# Patient Record
Sex: MALE | Race: WHITE | NOT HISPANIC OR LATINO
[De-identification: names, ages, dates, MRNs, and addresses within clinical notes are randomized per-mention and may not be internally consistent; named-entity substitution may affect disease eponyms.]

---

## 2017-02-16 PROBLEM — Z00.00 ENCOUNTER FOR PREVENTIVE HEALTH EXAMINATION: Status: ACTIVE | Noted: 2017-02-16

## 2017-03-06 ENCOUNTER — APPOINTMENT (OUTPATIENT)
Age: 72
End: 2017-03-06

## 2017-03-06 ENCOUNTER — OUTPATIENT (OUTPATIENT)
Dept: OUTPATIENT SERVICES | Facility: HOSPITAL | Age: 72
LOS: 1 days | Discharge: HOME | End: 2017-03-06

## 2017-03-16 ENCOUNTER — APPOINTMENT (OUTPATIENT)
Dept: SURGERY | Facility: CLINIC | Age: 72
End: 2017-03-16

## 2017-06-27 DIAGNOSIS — K43.2 INCISIONAL HERNIA WITHOUT OBSTRUCTION OR GANGRENE: ICD-10-CM

## 2017-06-27 DIAGNOSIS — I10 ESSENTIAL (PRIMARY) HYPERTENSION: ICD-10-CM

## 2017-06-27 DIAGNOSIS — K66.0 PERITONEAL ADHESIONS (POSTPROCEDURAL) (POSTINFECTION): ICD-10-CM

## 2017-06-27 DIAGNOSIS — E78.00 PURE HYPERCHOLESTEROLEMIA, UNSPECIFIED: ICD-10-CM

## 2020-04-04 ENCOUNTER — INPATIENT (INPATIENT)
Facility: HOSPITAL | Age: 75
LOS: 5 days | Discharge: HOME | End: 2020-04-10
Attending: INTERNAL MEDICINE | Admitting: INTERNAL MEDICINE
Payer: MEDICARE

## 2020-04-04 VITALS
RESPIRATION RATE: 24 BRPM | OXYGEN SATURATION: 92 % | HEART RATE: 118 BPM | SYSTOLIC BLOOD PRESSURE: 138 MMHG | DIASTOLIC BLOOD PRESSURE: 76 MMHG | TEMPERATURE: 99 F

## 2020-04-04 LAB
ALBUMIN SERPL ELPH-MCNC: 3.4 G/DL — LOW (ref 3.5–5.2)
ALBUMIN SERPL ELPH-MCNC: 3.6 G/DL — SIGNIFICANT CHANGE UP (ref 3.5–5.2)
ALP SERPL-CCNC: 58 U/L — SIGNIFICANT CHANGE UP (ref 30–115)
ALP SERPL-CCNC: 80 U/L — SIGNIFICANT CHANGE UP (ref 30–115)
ALT FLD-CCNC: 14 U/L — SIGNIFICANT CHANGE UP (ref 0–41)
ALT FLD-CCNC: 31 U/L — SIGNIFICANT CHANGE UP (ref 0–41)
ANION GAP SERPL CALC-SCNC: 12 MMOL/L — SIGNIFICANT CHANGE UP (ref 7–14)
ANION GAP SERPL CALC-SCNC: 16 MMOL/L — HIGH (ref 7–14)
AST SERPL-CCNC: 31 U/L — SIGNIFICANT CHANGE UP (ref 0–41)
AST SERPL-CCNC: 37 U/L — SIGNIFICANT CHANGE UP (ref 0–41)
BASE EXCESS BLDV CALC-SCNC: 1.3 MMOL/L — SIGNIFICANT CHANGE UP (ref -2–2)
BASOPHILS # BLD AUTO: 0.01 K/UL — SIGNIFICANT CHANGE UP (ref 0–0.2)
BASOPHILS NFR BLD AUTO: 0.2 % — SIGNIFICANT CHANGE UP (ref 0–1)
BILIRUB SERPL-MCNC: 0.5 MG/DL — SIGNIFICANT CHANGE UP (ref 0.2–1.2)
BILIRUB SERPL-MCNC: 0.8 MG/DL — SIGNIFICANT CHANGE UP (ref 0.2–1.2)
BUN SERPL-MCNC: 23 MG/DL — HIGH (ref 10–20)
BUN SERPL-MCNC: 9 MG/DL — LOW (ref 10–20)
CA-I SERPL-SCNC: 1.07 MMOL/L — LOW (ref 1.12–1.3)
CALCIUM SERPL-MCNC: 8.3 MG/DL — LOW (ref 8.5–10.1)
CALCIUM SERPL-MCNC: 8.7 MG/DL — SIGNIFICANT CHANGE UP (ref 8.5–10.1)
CHLORIDE SERPL-SCNC: 72 MMOL/L — LOW (ref 98–110)
CHLORIDE SERPL-SCNC: 98 MMOL/L — SIGNIFICANT CHANGE UP (ref 98–110)
CO2 SERPL-SCNC: 22 MMOL/L — SIGNIFICANT CHANGE UP (ref 17–32)
CO2 SERPL-SCNC: 36 MMOL/L — HIGH (ref 17–32)
CREAT SERPL-MCNC: 0.5 MG/DL — LOW (ref 0.7–1.5)
CREAT SERPL-MCNC: 1 MG/DL — SIGNIFICANT CHANGE UP (ref 0.7–1.5)
EOSINOPHIL # BLD AUTO: 0.01 K/UL — SIGNIFICANT CHANGE UP (ref 0–0.7)
EOSINOPHIL NFR BLD AUTO: 0.2 % — SIGNIFICANT CHANGE UP (ref 0–8)
GAS PNL BLDV: 136 MMOL/L — SIGNIFICANT CHANGE UP (ref 136–145)
GAS PNL BLDV: SIGNIFICANT CHANGE UP
GAS PNL BLDV: SIGNIFICANT CHANGE UP
GLUCOSE SERPL-MCNC: 112 MG/DL — HIGH (ref 70–99)
GLUCOSE SERPL-MCNC: 186 MG/DL — HIGH (ref 70–99)
HCO3 BLDV-SCNC: 24 MMOL/L — SIGNIFICANT CHANGE UP (ref 22–29)
HCT VFR BLD CALC: 36.5 % — LOW (ref 42–52)
HCT VFR BLDA CALC: 49 % — HIGH (ref 34–44)
HGB BLD CALC-MCNC: 16 G/DL — SIGNIFICANT CHANGE UP (ref 14–18)
HGB BLD-MCNC: 11.8 G/DL — LOW (ref 14–18)
IMM GRANULOCYTES NFR BLD AUTO: 0.8 % — HIGH (ref 0.1–0.3)
LACTATE BLDV-MCNC: 2 MMOL/L — HIGH (ref 0.5–1.6)
LACTATE SERPL-SCNC: 2 MMOL/L — SIGNIFICANT CHANGE UP (ref 0.7–2)
LIDOCAIN IGE QN: 24 U/L — SIGNIFICANT CHANGE UP (ref 7–60)
LYMPHOCYTES # BLD AUTO: 0.44 K/UL — LOW (ref 1.2–3.4)
LYMPHOCYTES # BLD AUTO: 7.3 % — LOW (ref 20.5–51.1)
MAGNESIUM SERPL-MCNC: 1.7 MG/DL — LOW (ref 1.8–2.4)
MCHC RBC-ENTMCNC: 23.8 PG — LOW (ref 27–31)
MCHC RBC-ENTMCNC: 32.3 G/DL — SIGNIFICANT CHANGE UP (ref 32–37)
MCV RBC AUTO: 73.6 FL — LOW (ref 80–94)
MONOCYTES # BLD AUTO: 0.72 K/UL — HIGH (ref 0.1–0.6)
MONOCYTES NFR BLD AUTO: 11.9 % — HIGH (ref 1.7–9.3)
NEUTROPHILS # BLD AUTO: 4.8 K/UL — SIGNIFICANT CHANGE UP (ref 1.4–6.5)
NEUTROPHILS NFR BLD AUTO: 79.6 % — HIGH (ref 42.2–75.2)
NRBC # BLD: 0 /100 WBCS — SIGNIFICANT CHANGE UP (ref 0–0)
PCO2 BLDV: 33 MMHG — LOW (ref 41–51)
PH BLDV: 7.47 — HIGH (ref 7.26–7.43)
PLATELET # BLD AUTO: 227 K/UL — SIGNIFICANT CHANGE UP (ref 130–400)
PO2 BLDV: 30 MMHG — SIGNIFICANT CHANGE UP (ref 20–40)
POTASSIUM BLDV-SCNC: 3 MMOL/L — LOW (ref 3.3–5.6)
POTASSIUM SERPL-MCNC: 3.6 MMOL/L — SIGNIFICANT CHANGE UP (ref 3.5–5)
POTASSIUM SERPL-MCNC: 3.8 MMOL/L — SIGNIFICANT CHANGE UP (ref 3.5–5)
POTASSIUM SERPL-SCNC: 3.6 MMOL/L — SIGNIFICANT CHANGE UP (ref 3.5–5)
POTASSIUM SERPL-SCNC: 3.8 MMOL/L — SIGNIFICANT CHANGE UP (ref 3.5–5)
PROT SERPL-MCNC: 6 G/DL — SIGNIFICANT CHANGE UP (ref 6–8)
PROT SERPL-MCNC: 6.3 G/DL — SIGNIFICANT CHANGE UP (ref 6–8)
RBC # BLD: 4.96 M/UL — SIGNIFICANT CHANGE UP (ref 4.7–6.1)
RBC # FLD: 15.6 % — HIGH (ref 11.5–14.5)
SAO2 % BLDV: 60 % — SIGNIFICANT CHANGE UP
SODIUM SERPL-SCNC: 120 MMOL/L — LOW (ref 135–146)
SODIUM SERPL-SCNC: 136 MMOL/L — SIGNIFICANT CHANGE UP (ref 135–146)
WBC # BLD: 6.03 K/UL — SIGNIFICANT CHANGE UP (ref 4.8–10.8)
WBC # FLD AUTO: 6.03 K/UL — SIGNIFICANT CHANGE UP (ref 4.8–10.8)

## 2020-04-04 PROCEDURE — 99285 EMERGENCY DEPT VISIT HI MDM: CPT

## 2020-04-04 PROCEDURE — 99223 1ST HOSP IP/OBS HIGH 75: CPT | Mod: AI

## 2020-04-04 PROCEDURE — 93010 ELECTROCARDIOGRAM REPORT: CPT

## 2020-04-04 PROCEDURE — 71045 X-RAY EXAM CHEST 1 VIEW: CPT | Mod: 26

## 2020-04-04 RX ORDER — SODIUM CHLORIDE 9 MG/ML
1000 INJECTION, SOLUTION INTRAVENOUS
Refills: 0 | Status: DISCONTINUED | OUTPATIENT
Start: 2020-04-04 | End: 2020-04-06

## 2020-04-04 RX ORDER — SODIUM CHLORIDE 9 MG/ML
1000 INJECTION INTRAMUSCULAR; INTRAVENOUS; SUBCUTANEOUS
Refills: 0 | Status: DISCONTINUED | OUTPATIENT
Start: 2020-04-04 | End: 2020-04-04

## 2020-04-04 RX ORDER — LANOLIN ALCOHOL/MO/W.PET/CERES
5 CREAM (GRAM) TOPICAL AT BEDTIME
Refills: 0 | Status: DISCONTINUED | OUTPATIENT
Start: 2020-04-04 | End: 2020-04-10

## 2020-04-04 RX ORDER — ACETAMINOPHEN 500 MG
650 TABLET ORAL ONCE
Refills: 0 | Status: COMPLETED | OUTPATIENT
Start: 2020-04-04 | End: 2020-04-04

## 2020-04-04 RX ORDER — AMLODIPINE BESYLATE 2.5 MG/1
1 TABLET ORAL
Qty: 0 | Refills: 0 | DISCHARGE

## 2020-04-04 RX ORDER — HEPARIN SODIUM 5000 [USP'U]/ML
5000 INJECTION INTRAVENOUS; SUBCUTANEOUS EVERY 8 HOURS
Refills: 0 | Status: DISCONTINUED | OUTPATIENT
Start: 2020-04-04 | End: 2020-04-10

## 2020-04-04 RX ADMIN — SODIUM CHLORIDE 125 MILLILITER(S): 9 INJECTION INTRAMUSCULAR; INTRAVENOUS; SUBCUTANEOUS at 14:46

## 2020-04-04 RX ADMIN — HEPARIN SODIUM 5000 UNIT(S): 5000 INJECTION INTRAVENOUS; SUBCUTANEOUS at 22:11

## 2020-04-04 RX ADMIN — Medication 650 MILLIGRAM(S): at 12:43

## 2020-04-04 NOTE — ED PROVIDER NOTE - PHYSICAL EXAMINATION
VITAL SIGNS: I have reviewed nursing notes and confirm.  CONSTITUTIONAL: Well-developed; well-nourished; in no acute distress.  SKIN: Skin exam is warm and dry, no acute rash.  HEAD: Normocephalic; atraumatic.  EYES: PERRL, EOM intact; conjunctiva and sclera clear.  ENT: No nasal discharge; airway clear. TMs clear.  NECK: Supple; non tender.  CARD: S1, S2 normal; no murmurs, gallops, or rubs. Regular rate and rhythm.  RESP: bibasilar rales  ABD: Normal bowel sounds; soft; non-distended; non-tender;  EXT: Normal ROM. No clubbing, cyanosis or edema.  NEURO: aox3, cn2-12 grossly normal, 5/5 strength all ext, sensation intact,   PSYCH: Cooperative, appropriate.

## 2020-04-04 NOTE — H&P ADULT - HISTORY OF PRESENT ILLNESS
73 yo male with PMH of Asthma and HTN, presents with c/o weakness, sob and cough.  Patient states he has been having cough and sob x 2 weeks that has been getting progressively worse. He also states that he has been having fever that are getting worse. He endorses that his wife has tested positive for COVID 19. He denies any other symptoms.

## 2020-04-04 NOTE — ED PROVIDER NOTE - OBJECTIVE STATEMENT
75 yo m with pmh of asthma, htn, presents with c/o weakness, sob and cough.  pt says has been having cough and sob x 2 weeks.  wife also had similar sx, and tested positive for covid.  pt says in the last 2-3 days, he has become so weak, so came in for eval.  pt has also been having fever.  no cp, no abd pain, but has had diarrhea.  no n/v.

## 2020-04-04 NOTE — H&P ADULT - NSHPPHYSICALEXAM_GEN_ALL_CORE
PHYSICAL EXAM:  GENERAL: patient in mild distress however he is comfortable. Speaking in full sentences.   HEAD:  Atraumatic, Normocephalic  EYES: EOMI, PERRLA, conjunctiva and sclera clear  NECK: Supple, No JVD  CHEST/LUNG: Good air entry bilaterally. Crackles noted at bilateral bases.   HEART: Regular rate and rhythm; No murmurs;   ABDOMEN: Soft, Nontender, Nondistended; Bowel sounds present; No guarding  EXTREMITIES:  2+ Peripheral Pulses, No cyanosis or edema  PSYCH: AAOx3  NEUROLOGY: non-focal  SKIN: No rashes or lesions

## 2020-04-04 NOTE — ED ADULT NURSE NOTE - NSIMPLEMENTINTERV_GEN_ALL_ED
Implemented All Fall with Harm Risk Interventions:  [place] to call system. Call bell, personal items and telephone within reach. Instruct patient to call for assistance. Room bathroom lighting operational. Non-slip footwear when patient is off stretcher. Physically safe environment: no spills, clutter or unnecessary equipment. Stretcher in lowest position, wheels locked, appropriate side rails in place. Provide visual cue, wrist band, yellow gown, etc. Monitor gait and stability. Monitor for mental status changes and reorient to person, place, and time. Review medications for side effects contributing to fall risk. Reinforce activity limits and safety measures with patient and family. Provide visual clues: red socks.

## 2020-04-04 NOTE — ED PROVIDER NOTE - NS ED ROS FT
Review of Systems:  	•	CONSTITUTIONAL - no fever, no diaphoresis, no weight change, generalized weakness  	•	SKIN - no rash  	•	HEMATOLOGIC - no bleeding, no bruising  	•	EYES - no eye pain, no blurred vision  	•	ENT - no change in hearing, no pain  	•	RESPIRATORY - + shortness of breath, + cough  	•	CARDIAC - no chest pain, no palpitations  	•	GI - no abd pain, no nausea, no vomiting, no diarrhea, no constipation, no bleeding  	•	 - no dysuria, no hematuria, no flank pain, no urinary retention  	•	MUSCULOSKELETAL - no joint paint, no swelling, no redness  	•	NEUROLOGIC - no weakness, no headache, no paresthesias, no dizziness  All other systems negative, unless specified in HPI

## 2020-04-04 NOTE — H&P ADULT - ASSESSMENT
73 yo male with PMH of Asthma and HTN, presents with c/o weakness, sob and cough.  Patient states he has been having cough and sob x 2 weeks that has been getting progressively worse. He also states that he has been having fever that are getting worse. He endorses that his wife has tested positive for COVID 19. He denies any other symptoms.     1) Hypoxia likely secondary to Viral Etiology   Chest Xray reveals evidence of acute cardiopulmonary disease.   Follow with Procalcitonin and COVID 19 testing   Start Hydroxychloroquine once EKG resulted   Tylenol for fever     2) Hyponatremia   Continue with Normal Saline   Follow with 8pm cmp     3) DVT Prophylaxis   Heparin Sub Q     4) HTN   stable at this time   Will hold blood pressure medications at this time     5) Disposition   Admit to medicine   Follow with COVID 19 testing   Start Hydroxychloroquine once EKG resulted

## 2020-04-04 NOTE — ED PROVIDER NOTE - CLINICAL SUMMARY MEDICAL DECISION MAKING FREE TEXT BOX
Pt with hypoxia and dyspnic, desaturates to 90% on RA, wife was covid +, cxr reassuring, will r/o covid

## 2020-04-04 NOTE — H&P ADULT - NSHPLABSRESULTS_GEN_ALL_CORE
11.8   6.03  )-----------( 227      ( 04 Apr 2020 12:33 )             36.5     04-04    120<L>  |  72<L>  |  9<L>  ----------------------------<  186<H>  3.8   |  36<H>  |  0.5<L>    Ca    8.7      04 Apr 2020 12:33  Mg     1.7     04-04    TPro  6.0  /  Alb  3.6  /  TBili  0.8  /  DBili  x   /  AST  37  /  ALT  31  /  AlkPhos  80  04-04    < from: Xray Chest 1 View-PORTABLE IMMEDIATE (04.04.20 @ 14:45) >    Impression:      No radiographic evidence of acute cardiopulmonary disease.    < end of copied text >

## 2020-04-04 NOTE — H&P ADULT - ATTENDING COMMENTS
patient seen and examined , agree with pgy 3 assesment and plan except as indicated above,   GEN Lying in no acute distress  HEENT Pupils equal and reactive to light and accommodationSupple Neck  PULM Clear to auscultation bilaterally  CV s1s2 regular rate and rhythm  GI + bowel sounds nontnender  EXT no cyanosis or edema  PSYCH awake alert and oriented x 3  INTEG No Lesions  NEURO MENDOZA  #Acute hypoxic respiraotry failure secondary to viral etiology suspect covid 19   titrate off of oxygen , dyspneic on speaking (3-4 words at a time) , check crp , esr, ddimer  #Hyponatremia - likely error because on vbg and subsequent cmp 136 , monitor for now   #Anemia no evidence of blood loss will monitor   Progress Note Handoff    Pending:  titration off of oxygen , monitor crp ,esr , and if titrated off of oxygen expect dc 24-48 hours  Family discussion: patient verbalized understanding and agreeable to plan of care     Disposition: Home___

## 2020-04-05 LAB
ALBUMIN SERPL ELPH-MCNC: 3.1 G/DL — LOW (ref 3.5–5.2)
ALP SERPL-CCNC: 56 U/L — SIGNIFICANT CHANGE UP (ref 30–115)
ALT FLD-CCNC: 13 U/L — SIGNIFICANT CHANGE UP (ref 0–41)
ANION GAP SERPL CALC-SCNC: 13 MMOL/L — SIGNIFICANT CHANGE UP (ref 7–14)
AST SERPL-CCNC: 32 U/L — SIGNIFICANT CHANGE UP (ref 0–41)
BASOPHILS # BLD AUTO: 0 K/UL — SIGNIFICANT CHANGE UP (ref 0–0.2)
BASOPHILS # BLD AUTO: 0.03 K/UL — SIGNIFICANT CHANGE UP (ref 0–0.2)
BASOPHILS NFR BLD AUTO: 0 % — SIGNIFICANT CHANGE UP (ref 0–1)
BASOPHILS NFR BLD AUTO: 0.2 % — SIGNIFICANT CHANGE UP (ref 0–1)
BILIRUB SERPL-MCNC: 0.6 MG/DL — SIGNIFICANT CHANGE UP (ref 0.2–1.2)
BUN SERPL-MCNC: 23 MG/DL — HIGH (ref 10–20)
CALCIUM SERPL-MCNC: 8.4 MG/DL — LOW (ref 8.5–10.1)
CHLORIDE SERPL-SCNC: 102 MMOL/L — SIGNIFICANT CHANGE UP (ref 98–110)
CO2 SERPL-SCNC: 24 MMOL/L — SIGNIFICANT CHANGE UP (ref 17–32)
CREAT SERPL-MCNC: 1.1 MG/DL — SIGNIFICANT CHANGE UP (ref 0.7–1.5)
D DIMER BLD IA.RAPID-MCNC: 752 NG/ML DDU — HIGH (ref 0–230)
EOSINOPHIL # BLD AUTO: 0 K/UL — SIGNIFICANT CHANGE UP (ref 0–0.7)
EOSINOPHIL # BLD AUTO: 0 K/UL — SIGNIFICANT CHANGE UP (ref 0–0.7)
EOSINOPHIL NFR BLD AUTO: 0 % — SIGNIFICANT CHANGE UP (ref 0–8)
EOSINOPHIL NFR BLD AUTO: 0 % — SIGNIFICANT CHANGE UP (ref 0–8)
FIBRINOGEN PPP-MCNC: >700 MG/DL — HIGH (ref 204.4–570.6)
GIANT PLATELETS BLD QL SMEAR: PRESENT — SIGNIFICANT CHANGE UP
GLUCOSE SERPL-MCNC: 134 MG/DL — HIGH (ref 70–99)
HCT VFR BLD CALC: 40.6 % — LOW (ref 42–52)
HCT VFR BLD CALC: 42.8 % — SIGNIFICANT CHANGE UP (ref 42–52)
HGB BLD-MCNC: 14.4 G/DL — SIGNIFICANT CHANGE UP (ref 14–18)
HGB BLD-MCNC: 15.7 G/DL — SIGNIFICANT CHANGE UP (ref 14–18)
IMM GRANULOCYTES NFR BLD AUTO: 0.9 % — HIGH (ref 0.1–0.3)
LYMPHOCYTES # BLD AUTO: 0.3 K/UL — LOW (ref 1.2–3.4)
LYMPHOCYTES # BLD AUTO: 1.14 K/UL — LOW (ref 1.2–3.4)
LYMPHOCYTES # BLD AUTO: 2.6 % — LOW (ref 20.5–51.1)
LYMPHOCYTES # BLD AUTO: 7.2 % — LOW (ref 20.5–51.1)
MACROCYTES BLD QL: SLIGHT — SIGNIFICANT CHANGE UP
MANUAL SMEAR VERIFICATION: SIGNIFICANT CHANGE UP
MCHC RBC-ENTMCNC: 34.4 PG — HIGH (ref 27–31)
MCHC RBC-ENTMCNC: 35.5 G/DL — SIGNIFICANT CHANGE UP (ref 32–37)
MCHC RBC-ENTMCNC: 35.6 PG — HIGH (ref 27–31)
MCHC RBC-ENTMCNC: 36.7 G/DL — SIGNIFICANT CHANGE UP (ref 32–37)
MCV RBC AUTO: 96.9 FL — HIGH (ref 80–94)
MCV RBC AUTO: 97.1 FL — HIGH (ref 80–94)
MONOCYTES # BLD AUTO: 0.3 K/UL — SIGNIFICANT CHANGE UP (ref 0.1–0.6)
MONOCYTES # BLD AUTO: 0.36 K/UL — SIGNIFICANT CHANGE UP (ref 0.1–0.6)
MONOCYTES NFR BLD AUTO: 2.3 % — SIGNIFICANT CHANGE UP (ref 1.7–9.3)
MONOCYTES NFR BLD AUTO: 2.6 % — SIGNIFICANT CHANGE UP (ref 1.7–9.3)
NEUTROPHILS # BLD AUTO: 10.78 K/UL — HIGH (ref 1.4–6.5)
NEUTROPHILS # BLD AUTO: 14.14 K/UL — HIGH (ref 1.4–6.5)
NEUTROPHILS NFR BLD AUTO: 89.4 % — HIGH (ref 42.2–75.2)
NEUTROPHILS NFR BLD AUTO: 90.4 % — HIGH (ref 42.2–75.2)
NEUTS BAND # BLD: 3.5 % — SIGNIFICANT CHANGE UP (ref 0–6)
NRBC # BLD: 0 /100 WBCS — SIGNIFICANT CHANGE UP (ref 0–0)
PLAT MORPH BLD: NORMAL — SIGNIFICANT CHANGE UP
PLATELET # BLD AUTO: 211 K/UL — SIGNIFICANT CHANGE UP (ref 130–400)
PLATELET # BLD AUTO: 234 K/UL — SIGNIFICANT CHANGE UP (ref 130–400)
POIKILOCYTOSIS BLD QL AUTO: SLIGHT — SIGNIFICANT CHANGE UP
POTASSIUM SERPL-MCNC: 3.6 MMOL/L — SIGNIFICANT CHANGE UP (ref 3.5–5)
POTASSIUM SERPL-SCNC: 3.6 MMOL/L — SIGNIFICANT CHANGE UP (ref 3.5–5)
PROT SERPL-MCNC: 6 G/DL — SIGNIFICANT CHANGE UP (ref 6–8)
RBC # BLD: 4.19 M/UL — LOW (ref 4.7–6.1)
RBC # BLD: 4.41 M/UL — LOW (ref 4.7–6.1)
RBC # FLD: 13.4 % — SIGNIFICANT CHANGE UP (ref 11.5–14.5)
RBC # FLD: 13.5 % — SIGNIFICANT CHANGE UP (ref 11.5–14.5)
RBC BLD AUTO: ABNORMAL
SARS-COV-2 RNA SPEC QL NAA+PROBE: DETECTED
SODIUM SERPL-SCNC: 139 MMOL/L — SIGNIFICANT CHANGE UP (ref 135–146)
VARIANT LYMPHS # BLD: 0.9 % — SIGNIFICANT CHANGE UP (ref 0–5)
WBC # BLD: 11.48 K/UL — HIGH (ref 4.8–10.8)
WBC # BLD: 15.81 K/UL — HIGH (ref 4.8–10.8)
WBC # FLD AUTO: 11.48 K/UL — HIGH (ref 4.8–10.8)
WBC # FLD AUTO: 15.81 K/UL — HIGH (ref 4.8–10.8)

## 2020-04-05 PROCEDURE — 99233 SBSQ HOSP IP/OBS HIGH 50: CPT

## 2020-04-05 RX ORDER — ACETAMINOPHEN 500 MG
650 TABLET ORAL EVERY 6 HOURS
Refills: 0 | Status: DISCONTINUED | OUTPATIENT
Start: 2020-04-05 | End: 2020-04-10

## 2020-04-05 RX ORDER — HYDROXYCHLOROQUINE SULFATE 200 MG
400 TABLET ORAL EVERY 12 HOURS
Refills: 0 | Status: DISCONTINUED | OUTPATIENT
Start: 2020-04-05 | End: 2020-04-10

## 2020-04-05 RX ORDER — HYDROXYCHLOROQUINE SULFATE 200 MG
200 TABLET ORAL EVERY 12 HOURS
Refills: 0 | Status: COMPLETED | OUTPATIENT
Start: 2020-04-06 | End: 2020-04-09

## 2020-04-05 RX ORDER — HYDROXYCHLOROQUINE SULFATE 200 MG
TABLET ORAL
Refills: 0 | Status: DISCONTINUED | OUTPATIENT
Start: 2020-04-05 | End: 2020-04-10

## 2020-04-05 RX ADMIN — Medication 650 MILLIGRAM(S): at 11:15

## 2020-04-05 RX ADMIN — HEPARIN SODIUM 5000 UNIT(S): 5000 INJECTION INTRAVENOUS; SUBCUTANEOUS at 14:46

## 2020-04-05 RX ADMIN — Medication 650 MILLIGRAM(S): at 01:54

## 2020-04-05 RX ADMIN — Medication 650 MILLIGRAM(S): at 16:51

## 2020-04-05 RX ADMIN — SODIUM CHLORIDE 75 MILLILITER(S): 9 INJECTION, SOLUTION INTRAVENOUS at 00:57

## 2020-04-05 RX ADMIN — HEPARIN SODIUM 5000 UNIT(S): 5000 INJECTION INTRAVENOUS; SUBCUTANEOUS at 05:57

## 2020-04-05 RX ADMIN — Medication 400 MILLIGRAM(S): at 10:45

## 2020-04-05 NOTE — PROGRESS NOTE ADULT - SUBJECTIVE AND OBJECTIVE BOX
Pt seen and examined at bedside. No CP or SOB. Pt was complaining of chills. No fever.     PAST MEDICAL & SURGICAL HISTORY:  Hypertension  Asthma      VITAL SIGNS (Last 24 hrs):  T(C): 36.3 (04-05-20 @ 12:26), Max: 38.6 (04-05-20 @ 11:15)  HR: 86 (04-05-20 @ 08:00) (86 - 103)  BP: 133/60 (04-05-20 @ 08:00) (133/60 - 135/64)  RR: 19 (04-05-20 @ 08:00) (18 - 20)  SpO2: 96% (04-05-20 @ 08:00) (92% - 97%)  Wt(kg): --  Daily     Daily     I&O's Summary      PHYSICAL EXAM:  GENERAL: NAD, well-developed  HEAD:  Atraumatic, Normocephalic  EYES: EOMI, PERRLA, conjunctiva and sclera clear  NECK: Supple, No JVD  CHEST/LUNG: Clear to auscultation bilaterally; No wheeze  HEART: Regular rate and rhythm; No murmurs, rubs, or gallops  ABDOMEN: Soft, Nontender, Nondistended; Bowel sounds present  EXTREMITIES:  2+ Peripheral Pulses, No clubbing, cyanosis, or edema  PSYCH: AAOx3  NEUROLOGY: non-focal  SKIN: No rashes or lesions    Labs Reviewed  Spoke to patient in regards to abnormal labs.    CBC Full  -  ( 05 Apr 2020 11:00 )  WBC Count : 15.81 K/uL  Hemoglobin : 15.7 g/dL  Hematocrit : 42.8 %  Platelet Count - Automated : 234 K/uL  Mean Cell Volume : 97.1 fL  Mean Cell Hemoglobin : 35.6 pg  Mean Cell Hemoglobin Concentration : 36.7 g/dL  Auto Neutrophil # : 14.14 K/uL  Auto Lymphocyte # : 1.14 K/uL  Auto Monocyte # : 0.36 K/uL  Auto Eosinophil # : 0.00 K/uL  Auto Basophil # : 0.03 K/uL  Auto Neutrophil % : 89.4 %  Auto Lymphocyte % : 7.2 %  Auto Monocyte % : 2.3 %  Auto Eosinophil % : 0.0 %  Auto Basophil % : 0.2 %    BMP:    04-05 @ 06:46    Blood Urea Nitrogen - 23  Calcium - 8.4  Carbond Dioxide - 24  Chloride - 102  Creatinine - 1.1  Glucose - 134  Potassium - 3.6  Sodium - 139      Hemoglobin A1c -     Urine Culture:        Imaging reviewed:   < from: Xray Chest 1 View-PORTABLE IMMEDIATE (04.04.20 @ 14:45) >  Impression:      No radiographic evidence of acute cardiopulmonary disease.    < end of copied text >      MEDICATIONS  (STANDING):  dextrose 5%. 1000 milliLiter(s) (75 mL/Hr) IV Continuous <Continuous>  heparin  Injectable 5000 Unit(s) SubCutaneous every 8 hours  hydroxychloroquine   Oral   hydroxychloroquine 400 milliGRAM(s) Oral every 12 hours  melatonin 5 milliGRAM(s) Oral at bedtime    MEDICATIONS  (PRN):  acetaminophen   Tablet .. 650 milliGRAM(s) Oral every 6 hours PRN Temp greater or equal to 38C (100.4F)

## 2020-04-05 NOTE — PROGRESS NOTE ADULT - ASSESSMENT
75 yo male with PMH of Asthma and HTN, presents with c/o weakness, sob and cough.  Patient states he has been having cough and sob x 2 weeks that has been getting progressively worse. He also states that he has been having fever that are getting worse. He endorses that his wife has tested positive for COVID 19. He denies any other symptoms.     1) Hypoxia likely secondary to Viral Etiology   Day 13 in course  No pneumonia on cxr  COVID positive   Continue HCQ, qtc 432, check ekg   Supplemental o2 as needed   follow up covid markers   still febrile, and on o2.     2) Hyponatremia- pt not real-vbg showed 136.- no need to trend for hyponatremia     3) DVT Prophylaxis   Heparin Sub Q     4) HTN   stable at this time   Will hold blood pressure medications at this time     #Progress Note Handoff  Pending (specify): supportive care  Family discussion: jayjay pt and agreed to plan  Disposition: Home__x_/SNF___/Other________/Unknown at this time________

## 2020-04-06 PROCEDURE — 99233 SBSQ HOSP IP/OBS HIGH 50: CPT

## 2020-04-06 PROCEDURE — 93010 ELECTROCARDIOGRAM REPORT: CPT

## 2020-04-06 RX ORDER — MAGNESIUM SULFATE 500 MG/ML
2 VIAL (ML) INJECTION ONCE
Refills: 0 | Status: COMPLETED | OUTPATIENT
Start: 2020-04-06 | End: 2020-04-06

## 2020-04-06 RX ORDER — SODIUM CHLORIDE 9 MG/ML
1000 INJECTION INTRAMUSCULAR; INTRAVENOUS; SUBCUTANEOUS
Refills: 0 | Status: DISCONTINUED | OUTPATIENT
Start: 2020-04-06 | End: 2020-04-07

## 2020-04-06 RX ADMIN — Medication 200 MILLIGRAM(S): at 14:42

## 2020-04-06 RX ADMIN — Medication 650 MILLIGRAM(S): at 05:58

## 2020-04-06 RX ADMIN — Medication 5 MILLIGRAM(S): at 21:36

## 2020-04-06 RX ADMIN — Medication 50 GRAM(S): at 11:38

## 2020-04-06 RX ADMIN — HEPARIN SODIUM 5000 UNIT(S): 5000 INJECTION INTRAVENOUS; SUBCUTANEOUS at 21:36

## 2020-04-06 RX ADMIN — HEPARIN SODIUM 5000 UNIT(S): 5000 INJECTION INTRAVENOUS; SUBCUTANEOUS at 05:59

## 2020-04-06 RX ADMIN — Medication 650 MILLIGRAM(S): at 21:43

## 2020-04-06 RX ADMIN — Medication 200 MILLIGRAM(S): at 21:36

## 2020-04-06 RX ADMIN — SODIUM CHLORIDE 75 MILLILITER(S): 9 INJECTION INTRAMUSCULAR; INTRAVENOUS; SUBCUTANEOUS at 14:44

## 2020-04-06 RX ADMIN — HEPARIN SODIUM 5000 UNIT(S): 5000 INJECTION INTRAVENOUS; SUBCUTANEOUS at 14:45

## 2020-04-06 NOTE — PROGRESS NOTE ADULT - ASSESSMENT
73 yo male with PMH of Asthma and HTN, presents with c/o weakness, sob and cough.  Patient states he has been having fever and progressive cough and sob for 2 weeks. Currently admitted for     # Sepsis and Acute Hypoxic Respiratory Failure secondary to COVID19  - COVID19 PCR positive: 4/4/20  - On admission, patient febrile to 104, saturating 95% on 4L O2 via NC; labs significant for lymphopenia  - Admission CXR negative  - Patient currently febrile (Tmax 101.4), saturating 96% on 3L; labs significant for leukocytosis: 11.48 > 15.8  - Will check procal and CRP  - Sepsis workup: BCx, UCx  - Repeat CXR in AM  - Plaquenil (day 2 of 5); QTc: 446  - Tylenol PRN for fever     # ELVIRA  - Creatinine: 0.5 > 1.1  - NS @ 75cc/hr  - follow up UA, urine studies  - Follow BMP    # Hyponatremia   - NS @ 75cc/hr   - Follow BMP    # HTN  - BP meds on hold for now    #DVT ppx: heparin subq  #GI ppx: not indicated  #Activity: as tolerated  #Diet: DASH  #Dispo: pending improvement in O2 requirement

## 2020-04-06 NOTE — PROGRESS NOTE ADULT - SUBJECTIVE AND OBJECTIVE BOX
SUBJECTIVE:    Patient is a 74y old Male who presents with a chief complaint of Hypoxia (06 Apr 2020 13:36)    Currently admitted to medicine with the primary diagnosis of Hypoxia     Today is hospital day 2d.     PAST MEDICAL & SURGICAL HISTORY  Hypertension  Asthma    ALLERGIES:  No Known Allergies    MEDICATIONS:  STANDING MEDICATIONS  heparin  Injectable 5000 Unit(s) SubCutaneous every 8 hours  hydroxychloroquine   Oral   hydroxychloroquine 400 milliGRAM(s) Oral every 12 hours  hydroxychloroquine 200 milliGRAM(s) Oral every 12 hours  melatonin 5 milliGRAM(s) Oral at bedtime  sodium chloride 0.9%. 1000 milliLiter(s) IV Continuous <Continuous>    PRN MEDICATIONS  acetaminophen   Tablet .. 650 milliGRAM(s) Oral every 6 hours PRN    VITALS:   T(F): 98.8  HR: 109  BP: 153/69  RR: 20  SpO2: 96%    LABS:                        15.7   15.81 )-----------( 234      ( 05 Apr 2020 11:00 )             42.8     04-05    139  |  102  |  23<H>  ----------------------------<  134<H>  3.6   |  24  |  1.1    Ca    8.4<L>      05 Apr 2020 06:46    TPro  6.0  /  Alb  3.1<L>  /  TBili  0.6  /  DBili  x   /  AST  32  /  ALT  13  /  AlkPhos  56  04-05                  RADIOLOGY:    PHYSICAL EXAM:  GEN: No acute distress  LUNGS: Clear to auscultation bilaterally   HEART: S1/S2 present. RRR.   ABD/ GI: Soft, non-tender, non-distended. Bowel sounds present  EXT: NC/NC/NE/2+PP/MENDOZA  NEURO: AAOX3

## 2020-04-06 NOTE — PROGRESS NOTE ADULT - ASSESSMENT
# covid pneumonia--patient is on nasal cannula-- looks comfortable, WBC elevated d dimer high--752 -- 96 on 2L.on hydroxychloroquine  # mild ELVIRA continue fluids  will try to wean off oxygen

## 2020-04-06 NOTE — PROGRESS NOTE ADULT - SUBJECTIVE AND OBJECTIVE BOX
Hospital Day:  2d    Subjective:    Patient is a 74y old  Male who presents with a chief complaint of Hypoxia (06 Apr 2020 16:22)    There were no acute overnight events. The patient was seen and examined at the bedside.     Past Medical Hx:   Hypertension  Asthma  Asthma with COPD    Past Sx:    Allergies:  No Known Allergies    Current Meds:   Standng Meds:  heparin  Injectable 5000 Unit(s) SubCutaneous every 8 hours  hydroxychloroquine   Oral   hydroxychloroquine 400 milliGRAM(s) Oral every 12 hours  hydroxychloroquine 200 milliGRAM(s) Oral every 12 hours  melatonin 5 milliGRAM(s) Oral at bedtime  sodium chloride 0.9%. 1000 milliLiter(s) (75 mL/Hr) IV Continuous <Continuous>    PRN Meds:  acetaminophen   Tablet .. 650 milliGRAM(s) Oral every 6 hours PRN Temp greater or equal to 38C (100.4F)    HOME MEDICATIONS:  amLODIPine 5 mg oral tablet: 1 tab(s) orally once a day      Vital Signs:   T(F): 101.8 (04-06-20 @ 20:51), Max: 101.8 (04-06-20 @ 20:51)  HR: 110 (04-06-20 @ 20:51) (68 - 110)  BP: 141/67 (04-06-20 @ 20:51) (111/57 - 153/69)  RR: 22 (04-06-20 @ 20:51) (18 - 22)  SpO2: 96% (04-06-20 @ 20:51) (94% - 96%)      04-05-20 @ 07:01  -  04-06-20 @ 07:00  --------------------------------------------------------  IN: 0 mL / OUT: 400 mL / NET: -400 mL    04-06-20 @ 07:01  -  04-06-20 @ 21:11  --------------------------------------------------------  IN: 590 mL / OUT: 0 mL / NET: 590 mL        Physical Exam:   Per attending note    Labs:                         15.7   15.81 )-----------( 234      ( 05 Apr 2020 11:00 )             42.8       05 Apr 2020 06:46    139    |  102    |  23     ----------------------------<  134    3.6     |  24     |  1.1      Ca    8.4        05 Apr 2020 06:46    TPro  6.0    /  Alb  3.1    /  TBili  0.6    /  DBili  x      /  AST  32     /  ALT  13     /  AlkPhos  56     05 Apr 2020 06:46        Amylase --, Lipase 24, 04-04-20 @ 12:33

## 2020-04-06 NOTE — PROGRESS NOTE ADULT - SUBJECTIVE AND OBJECTIVE BOX
I made rounds today with the treatment team including the hospitalist, residents,  nurses and  and discussed the patient's current medical status and discharge  planning needs, and reviewed the chart.    T(C): 35.6 (04-06-20 @ 12:18), Max: 38.5 (04-06-20 @ 06:00)  HR: 90 (04-06-20 @ 12:18) (68 - 103)  BP: 127/61 (04-06-20 @ 12:18) (111/57 - 139/86)  RR: 20 (04-06-20 @ 12:18) (18 - 22)  SpO2: 96% (04-06-20 @ 12:18) (94% - 96%)          I reached out to the patient's health care proxy/ responsible family member-           [   x  ]  I reached    Jackie Mcpherson     and discussed the patient's medical condition,                   family concerns, and discharge planning           [     ]  I left a message with family               [     ]  I personally participated in rounds with the medical team and my resident and discussed the case. My resident reached                   family member/ HCP                                under my direction and supervision  and we reviewed the conversaion.          [     ]  My resident left a message with family under my direction and supervision    The following was discussed: Patient is stable and doing well: Pulse ox is 96% on room air.  He has a temp of 101.3 and chest xray is unremarkable. ID consult is pending. He is on Plaqunal. She is a nurse concerned that his activity order is bedrest.          [     ] I spent 5-10 minutes on the above discussing medical issues with team members and family and/ or my resident    [     ] I spent 11-20 minutes on the above discussing medical issues with team members and family and/ or my resident    [  x   ] I spent 21-30 minutes on the above discussing medical issues with team members and family and/ or my resident

## 2020-04-07 LAB
ALBUMIN SERPL ELPH-MCNC: 2.9 G/DL — LOW (ref 3.5–5.2)
ALP SERPL-CCNC: 81 U/L — SIGNIFICANT CHANGE UP (ref 30–115)
ALT FLD-CCNC: 25 U/L — SIGNIFICANT CHANGE UP (ref 0–41)
ANION GAP SERPL CALC-SCNC: 17 MMOL/L — HIGH (ref 7–14)
AST SERPL-CCNC: 53 U/L — HIGH (ref 0–41)
BASOPHILS # BLD AUTO: 0.02 K/UL — SIGNIFICANT CHANGE UP (ref 0–0.2)
BASOPHILS NFR BLD AUTO: 0.2 % — SIGNIFICANT CHANGE UP (ref 0–1)
BILIRUB SERPL-MCNC: 0.8 MG/DL — SIGNIFICANT CHANGE UP (ref 0.2–1.2)
BUN SERPL-MCNC: 29 MG/DL — HIGH (ref 10–20)
CALCIUM SERPL-MCNC: 8.6 MG/DL — SIGNIFICANT CHANGE UP (ref 8.5–10.1)
CHLORIDE SERPL-SCNC: 102 MMOL/L — SIGNIFICANT CHANGE UP (ref 98–110)
CO2 SERPL-SCNC: 23 MMOL/L — SIGNIFICANT CHANGE UP (ref 17–32)
CREAT SERPL-MCNC: 0.9 MG/DL — SIGNIFICANT CHANGE UP (ref 0.7–1.5)
CRP SERPL-MCNC: 30.36 MG/DL — HIGH (ref 0–0.4)
EOSINOPHIL # BLD AUTO: 0.01 K/UL — SIGNIFICANT CHANGE UP (ref 0–0.7)
EOSINOPHIL NFR BLD AUTO: 0.1 % — SIGNIFICANT CHANGE UP (ref 0–8)
GLUCOSE SERPL-MCNC: 105 MG/DL — HIGH (ref 70–99)
HCT VFR BLD CALC: 40.4 % — LOW (ref 42–52)
HGB BLD-MCNC: 14.6 G/DL — SIGNIFICANT CHANGE UP (ref 14–18)
IMM GRANULOCYTES NFR BLD AUTO: 0.8 % — HIGH (ref 0.1–0.3)
LYMPHOCYTES # BLD AUTO: 0.93 K/UL — LOW (ref 1.2–3.4)
LYMPHOCYTES # BLD AUTO: 10.5 % — LOW (ref 20.5–51.1)
MCHC RBC-ENTMCNC: 34.7 PG — HIGH (ref 27–31)
MCHC RBC-ENTMCNC: 36.1 G/DL — SIGNIFICANT CHANGE UP (ref 32–37)
MCV RBC AUTO: 96 FL — HIGH (ref 80–94)
MONOCYTES # BLD AUTO: 0.21 K/UL — SIGNIFICANT CHANGE UP (ref 0.1–0.6)
MONOCYTES NFR BLD AUTO: 2.4 % — SIGNIFICANT CHANGE UP (ref 1.7–9.3)
NEUTROPHILS # BLD AUTO: 7.65 K/UL — HIGH (ref 1.4–6.5)
NEUTROPHILS NFR BLD AUTO: 86 % — HIGH (ref 42.2–75.2)
NRBC # BLD: 0 /100 WBCS — SIGNIFICANT CHANGE UP (ref 0–0)
PLATELET # BLD AUTO: 272 K/UL — SIGNIFICANT CHANGE UP (ref 130–400)
POTASSIUM SERPL-MCNC: 3.9 MMOL/L — SIGNIFICANT CHANGE UP (ref 3.5–5)
POTASSIUM SERPL-SCNC: 3.9 MMOL/L — SIGNIFICANT CHANGE UP (ref 3.5–5)
PROCALCITONIN SERPL-MCNC: 11.8 NG/ML — HIGH (ref 0.02–0.1)
PROT SERPL-MCNC: 6.2 G/DL — SIGNIFICANT CHANGE UP (ref 6–8)
RBC # BLD: 4.21 M/UL — LOW (ref 4.7–6.1)
RBC # FLD: 13.7 % — SIGNIFICANT CHANGE UP (ref 11.5–14.5)
SODIUM SERPL-SCNC: 142 MMOL/L — SIGNIFICANT CHANGE UP (ref 135–146)
WBC # BLD: 8.89 K/UL — SIGNIFICANT CHANGE UP (ref 4.8–10.8)
WBC # FLD AUTO: 8.89 K/UL — SIGNIFICANT CHANGE UP (ref 4.8–10.8)

## 2020-04-07 PROCEDURE — 99233 SBSQ HOSP IP/OBS HIGH 50: CPT

## 2020-04-07 PROCEDURE — 71045 X-RAY EXAM CHEST 1 VIEW: CPT | Mod: 26

## 2020-04-07 RX ORDER — CEFTRIAXONE 500 MG/1
1000 INJECTION, POWDER, FOR SOLUTION INTRAMUSCULAR; INTRAVENOUS EVERY 24 HOURS
Refills: 0 | Status: DISCONTINUED | OUTPATIENT
Start: 2020-04-07 | End: 2020-04-10

## 2020-04-07 RX ORDER — AZITHROMYCIN 500 MG/1
250 TABLET, FILM COATED ORAL EVERY 24 HOURS
Refills: 0 | Status: DISCONTINUED | OUTPATIENT
Start: 2020-04-08 | End: 2020-04-10

## 2020-04-07 RX ORDER — SODIUM CHLORIDE 0.65 %
1 AEROSOL, SPRAY (ML) NASAL
Refills: 0 | Status: DISCONTINUED | OUTPATIENT
Start: 2020-04-07 | End: 2020-04-10

## 2020-04-07 RX ORDER — AZITHROMYCIN 500 MG/1
500 TABLET, FILM COATED ORAL ONCE
Refills: 0 | Status: COMPLETED | OUTPATIENT
Start: 2020-04-07 | End: 2020-04-07

## 2020-04-07 RX ORDER — AZITHROMYCIN 500 MG/1
TABLET, FILM COATED ORAL
Refills: 0 | Status: DISCONTINUED | OUTPATIENT
Start: 2020-04-07 | End: 2020-04-07

## 2020-04-07 RX ORDER — AZITHROMYCIN 500 MG/1
TABLET, FILM COATED ORAL
Refills: 0 | Status: DISCONTINUED | OUTPATIENT
Start: 2020-04-07 | End: 2020-04-10

## 2020-04-07 RX ADMIN — Medication 650 MILLIGRAM(S): at 12:54

## 2020-04-07 RX ADMIN — Medication 200 MILLIGRAM(S): at 09:36

## 2020-04-07 RX ADMIN — HEPARIN SODIUM 5000 UNIT(S): 5000 INJECTION INTRAVENOUS; SUBCUTANEOUS at 20:54

## 2020-04-07 RX ADMIN — AZITHROMYCIN 255 MILLIGRAM(S): 500 TABLET, FILM COATED ORAL at 15:00

## 2020-04-07 RX ADMIN — CEFTRIAXONE 100 MILLIGRAM(S): 500 INJECTION, POWDER, FOR SOLUTION INTRAMUSCULAR; INTRAVENOUS at 15:50

## 2020-04-07 RX ADMIN — Medication 5 MILLIGRAM(S): at 20:54

## 2020-04-07 RX ADMIN — Medication 1 SPRAY(S): at 19:30

## 2020-04-07 RX ADMIN — HEPARIN SODIUM 5000 UNIT(S): 5000 INJECTION INTRAVENOUS; SUBCUTANEOUS at 15:01

## 2020-04-07 RX ADMIN — HEPARIN SODIUM 5000 UNIT(S): 5000 INJECTION INTRAVENOUS; SUBCUTANEOUS at 05:15

## 2020-04-07 RX ADMIN — Medication 200 MILLIGRAM(S): at 20:54

## 2020-04-07 NOTE — PROGRESS NOTE ADULT - SUBJECTIVE AND OBJECTIVE BOX
I made rounds today with the treatment team including the hospitalist, residents,  nurses and  and discussed the patient's current medical status and discharge  planning needs, and reviewed the chart.    T(C): 36.6 (04-07-20 @ 08:00), Max: 38.8 (04-06-20 @ 20:51)  HR: 88 (04-07-20 @ 08:00) (76 - 110)  BP: 142/67 (04-07-20 @ 08:00) (116/56 - 153/69)  RR: 21 (04-07-20 @ 08:00) (20 - 22)  SpO2: 91% (04-07-20 @ 08:00) (91% - 96%)          I reached out to the patient's health care proxy/ responsible family member-           [   x  ]  I reached   Jackie Mcpherson- significant other,  and discussed the patient's medical condition,                   family concerns, and discharge planning           [     ]  I left a message with family               [     ]  I personally participated in rounds with the medical team and my resident and discussed the case. My resident reached                   family member/ HCP                                under my direction and supervision  and we reviewed the conversation.          [     ]  My resident left a message with family under my direction and supervision    The following was discussed:  Patient had rigors and was not feeing well this morning. Pulse ox low 90s on 2 liters. Seen by ID. to start azithromycin,  and possibly ceftriaxone if further spikes by ID.  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 She also wants to make sure he gets mobilized ASAP and he is requesting lemonade and/or caffeine free diet coke and a dietary consult.  I shared his current meds.        [     ] I spent 5-10 minutes on the above discussing medical issues with team members and family and/ or my resident    [     ] I spent 11-20 minutes on the above discussing medical issues with team members and family and/ or my resident    [   x  ] I spent 21-30 minutes on the above discussing medical issues with team members and family and/ or my resident

## 2020-04-07 NOTE — CONSULT NOTE ADULT - SUBJECTIVE AND OBJECTIVE BOX
MARSHAL FULTON  74y, Male  Allergy: No Known Allergies      CHIEF COMPLAINT: Hypoxia (06 Apr 2020 21:09)      LOS  3d    HPI:  75 yo male with PMH of Asthma and HTN, presents with c/o weakness, sob and cough.  Patient states he has been having cough and sob x 2 weeks that has been getting progressively worse. He also states that he has been having fever that are getting worse. He endorses that his wife has tested positive for COVID 19. He denies any other symptoms. (04 Apr 2020 18:13)      INFECTIOUS DISEASE HISTORY:  SARS-CoV-2 +    PAST MEDICAL & SURGICAL HISTORY:  Hypertension  Asthma      FAMILY HISTORY  non-contributory     SOCIAL HISTORY  Social History:  denies tobacco, alcohol or drug use. (04 Apr 2020 18:13)        ROS  General: Denies rigors, nightsweats  HEENT: Denies headache, rhinorrhea, sore throat, eye pain  CV: Denies CP, palpitations  PULM: as noted above   GI: Denies hematemesis, hematochezia, melena  : Denies discharge, hematuria  MSK: Denies arthralgias, myalgias  SKIN: Denies rash, lesions  NEURO: Denies paresthesias, weakness  PSYCH: Denies depression, anxiety    VITALS:  T(F): 97.9, Max: 101.8 (04-06-20 @ 20:51)  HR: 88  BP: 142/67  RR: 21Vital Signs Last 24 Hrs  T(C): 36.6 (07 Apr 2020 08:00), Max: 38.8 (06 Apr 2020 20:51)  T(F): 97.9 (07 Apr 2020 08:00), Max: 101.8 (06 Apr 2020 20:51)  HR: 88 (07 Apr 2020 08:00) (76 - 110)  BP: 142/67 (07 Apr 2020 08:00) (116/56 - 153/69)  BP(mean): --  RR: 21 (07 Apr 2020 08:00) (20 - 22)  SpO2: 91% (07 Apr 2020 08:00) (91% - 96%)    PHYSICAL EXAM:  Gen: on NC 2L  HEENT: NCAT  Resp: b/l chest expansion  Neuro: nonfocal     TESTS & MEASUREMENTS:                        14.6   8.89  )-----------( 272      ( 07 Apr 2020 04:30 )             40.4     04-07    142  |  102  |  29<H>  ----------------------------<  105<H>  3.9   |  23  |  0.9    Ca    8.6      07 Apr 2020 04:30    TPro  6.2  /  Alb  2.9<L>  /  TBili  0.8  /  DBili  x   /  AST  53<H>  /  ALT  25  /  AlkPhos  81  04-07    eGFR if Non African American: 83 mL/min/1.73M2 (04-07-20 @ 04:30)  eGFR if African American: 96 mL/min/1.73M2 (04-07-20 @ 04:30)    LIVER FUNCTIONS - ( 07 Apr 2020 04:30 )  Alb: 2.9 g/dL / Pro: 6.2 g/dL / ALK PHOS: 81 U/L / ALT: 25 U/L / AST: 53 U/L / GGT: x                 Blood Gas Venous - Lactate: 2.0 mmoL/L (04-04-20 @ 12:52)  Lactate, Blood: 2.0 mmol/L (04-04-20 @ 12:33)      INFECTIOUS DISEASES TESTING  Procalcitonin, Serum: 11.80 ng/mL (04-06-20 @ 11:49)  COVID-19 PCR: Detected (04-04-20 @ 12:34)      RADIOLOGY & ADDITIONAL TESTS:  I have personally reviewed the last Chest xray  CXR      CT      CARDIOLOGY TESTING  12 Lead ECG:   Ventricular Rate 100 BPM    Atrial Rate 100 BPM    P-R Interval 226 ms    QRS Duration 92 ms    Q-T Interval 346 ms    QTC Calculation(Bezet) 446 ms    P Axis 64 degrees    R Axis -35 degrees    T Axis 46 degrees    Diagnosis Line Sinus rhythm [id number] degree A-V block  Left axis deviation  Left anterior fascicular block  Abnormal ECG    Confirmed by STEFANIA WATERMAN MD ([id number]) on 4/6/2020 7:03:06 PM (04-06-20 @ 14:21)  12 Lead ECG:   Ventricular Rate 111 BPM    Atrial Rate 111 BPM    P-R Interval 206 ms    QRS Duration 88 ms    Q-T Interval 318 ms    QTC Calculation(Bezet) 432 ms    P Axis 38 degrees    R Axis -18 degrees    T Axis 16 degrees    Diagnosis Line Sinus tachycardia  Low voltage QRS  Possible Inferior infarct , age undetermined  Abnormal ECG    Confirmed by Peg Lenz MD ([id number]) on 4/5/2020 5:44:03 AM (04-04-20 @ 21:47)      MEDICATIONS  heparin  Injectable 5000  hydroxychloroquine   hydroxychloroquine 400  hydroxychloroquine 200  melatonin 5      Weight  Weight (kg): 85.4 (04-06-20 @ 01:00)    ANTIBIOTICS:  hydroxychloroquine   Oral   hydroxychloroquine 400 milliGRAM(s) Oral every 12 hours  hydroxychloroquine 200 milliGRAM(s) Oral every 12 hours      ALLERGIES:  No Known Allergies

## 2020-04-07 NOTE — CONSULT NOTE ADULT - ASSESSMENT
ASSESSMENT  73 yo male with PMH of Asthma and HTN, presents with c/o weakness, sob and cough. Wife + COVID-19     IMPRESSION  # COVID-19 PNA possible superimposed bacterial PNA    CXR bilateral opacities     Procalcitonin, Serum: 11.80 ng/mL (04-06-20 @ 11:49)  #Severe Sepsis on admission T>101F, Pulse>90, Resp Rate>20 lactic acidosis  #Lactic acidosis  #Hyponatremia , resolved  #Blood Gas Venous - Lactate: 2.0 mmoL/L (04-04-20 @ 12:52)  #QTC Calculation(Bezet) 446 ms  #Cytokine Release Syndrome   D-Dimer Assay, Quantitative: 752 ng/mL DDU (04-05-20 @ 11:00)  Fibrinogen Assay: >700.0 mg/dL (04-05-20 @ 11:00)    RECOMMENDATIONS  - complete 5 days of plaquenil  - Start Azithro 500mg PO x1, then 250mg PO daily x 4 days   - repeat procalcitonin as elevated  - if spikes fever again, add Ceftriaxone 1g q24h IV     Spectra [id number]

## 2020-04-07 NOTE — PROGRESS NOTE ADULT - ASSESSMENT
75 yo male with PMH of Asthma and HTN, presents with c/o weakness, sob and cough.  Patient states he has been having fever and progressive cough and sob for 2 weeks. Currently admitted for     # Sepsis and Acute Hypoxic Respiratory Failure secondary to COVID19  - COVID19 PCR positive: 20  - On admission, patient febrile to 104, saturating 95% on 4L O2 via NC; labs significant for lymphopenia  - Admission CXR () shows peripheral opacities  - Repeat CXR () shows worsening peripheral opacities  - Patient febrile last night () (Tmax 101.8); febrile to 100.5 this morning with shaking chills; saturating 94% on 2L O2 via NC  - leukocytosis now resolved: 11.48 > 15.8 > 8.89  - Procalcitonin: 11.8  - CRP pending, will follow up result  - BCx sent, will follow up result  - s/p Plaquenil 400mg q12h loading dose x1 day  - continue Plaquenil 200mg po q12h (day 2 of 4)  - ID on board: start Azithromycin 500mg PO x1; then 250mg PO x4 days  - Per ID, will start ceftriaxone as patient has spiked another fever  - follow up repeat procalcitonin level  - Daily QTc monitoring; QTc: 446  - Daily CXR  - Continue oxygen supplementation PRN to keep SaO2 > 90%  - Tylenol PRN for fever     # ELVIRA - resolving  - Creatinine improvin.5 > 1.0 > 1.1 > 0.9  - follow up UA, urine studies  - Follow BMP    # Hyponatremia   - resolved    # HTN  - BP meds on hold for now    #DVT ppx: heparin subq  #GI ppx: not indicated  #Activity: as tolerated  #Diet: DASH  #Dispo: pending improvement in O2 requirement

## 2020-04-07 NOTE — PROGRESS NOTE ADULT - SUBJECTIVE AND OBJECTIVE BOX
Hospital Day:  3d    Subjective:    Patient is a 74y old  Male who presents with a chief complaint of Hypoxia (07 Apr 2020 12:22)    Patient had a fever last night (101.8). The patient was seen and examined at the bedside. He appears pale and is visibly trembling. He reports chills and malaise. States he is not short of breath and is coughing minimally. No abdominal pain, nausea, diarrhea.    Past Medical Hx:   Hypertension  Asthma  Asthma with COPD    Past Sx:    Allergies:  No Known Allergies    Current Meds:   Standng Meds:  azithromycin  IVPB      azithromycin  IVPB 500 milliGRAM(s) IV Intermittent once  heparin  Injectable 5000 Unit(s) SubCutaneous every 8 hours  hydroxychloroquine   Oral   hydroxychloroquine 400 milliGRAM(s) Oral every 12 hours  hydroxychloroquine 200 milliGRAM(s) Oral every 12 hours  melatonin 5 milliGRAM(s) Oral at bedtime    PRN Meds:  acetaminophen   Tablet .. 650 milliGRAM(s) Oral every 6 hours PRN Temp greater or equal to 38C (100.4F)    HOME MEDICATIONS:  amLODIPine 5 mg oral tablet: 1 tab(s) orally once a day      Vital Signs:   T(F): 97.9 (04-07-20 @ 08:00), Max: 101.8 (04-06-20 @ 20:51)  HR: 88 (04-07-20 @ 08:00) (76 - 110)  BP: 142/67 (04-07-20 @ 08:00) (116/56 - 153/69)  RR: 21 (04-07-20 @ 08:00) (20 - 22)  SpO2: 91% (04-07-20 @ 08:00) (91% - 96%)      04-06-20 @ 07:01  -  04-07-20 @ 07:00  --------------------------------------------------------  IN: 590 mL / OUT: 500 mL / NET: 90 mL        Physical Exam:   General: Patient in bed, pale, ill-appearing  HEENT: NCAT, conjunctiva clear, sclera white, PEERLA, EOMI, moist mucous membranes, normal orophaynx  Respiratory: fine inspiratory rales bilaterally  CV: Normal rate, regular rhythm, normal S1/S2  GI: abdomen soft, non-tender, non-distended  Extremities: Well-perfused, no clubbing, no cyanosis, no lower extremity edema bilaterally  Skin: warm and dry  Neurology: AAOx3, mentating appropriately, follows commands, nonfocal    Labs:                         14.6   8.89  )-----------( 272      ( 07 Apr 2020 04:30 )             40.4     Neutophil% 86.0, Lymphocyte% 10.5, Monocyte% 2.4, Bands% 0.8 04-07-20 @ 04:30    07 Apr 2020 04:30    142    |  102    |  29     ----------------------------<  105    3.9     |  23     |  0.9      Ca    8.6        07 Apr 2020 04:30    TPro  6.2    /  Alb  2.9    /  TBili  0.8    /  DBili  x      /  AST  53     /  ALT  25     /  AlkPhos  81     07 Apr 2020 04:30        Amylase --, Lipase 24, 04-04-20 @ 12:33                      Radiology:

## 2020-04-07 NOTE — PROGRESS NOTE ADULT - ATTENDING COMMENTS
Patient seen and examined independently. Agree with resident note.  # COVID positive-- had fever low grade --CRP is high--patient was 94% on 3l and can deteriorate at any time as he was not feeling good this am. needs to be monitored for worsening clinical course.

## 2020-04-08 LAB
ALBUMIN SERPL ELPH-MCNC: 2.9 G/DL — LOW (ref 3.5–5.2)
ALP SERPL-CCNC: 126 U/L — HIGH (ref 30–115)
ALT FLD-CCNC: 44 U/L — HIGH (ref 0–41)
ANION GAP SERPL CALC-SCNC: 16 MMOL/L — HIGH (ref 7–14)
AST SERPL-CCNC: 76 U/L — HIGH (ref 0–41)
BASOPHILS # BLD AUTO: 0.02 K/UL — SIGNIFICANT CHANGE UP (ref 0–0.2)
BASOPHILS NFR BLD AUTO: 0.3 % — SIGNIFICANT CHANGE UP (ref 0–1)
BILIRUB SERPL-MCNC: 0.8 MG/DL — SIGNIFICANT CHANGE UP (ref 0.2–1.2)
BUN SERPL-MCNC: 20 MG/DL — SIGNIFICANT CHANGE UP (ref 10–20)
CALCIUM SERPL-MCNC: 8.3 MG/DL — LOW (ref 8.5–10.1)
CHLORIDE SERPL-SCNC: 102 MMOL/L — SIGNIFICANT CHANGE UP (ref 98–110)
CO2 SERPL-SCNC: 21 MMOL/L — SIGNIFICANT CHANGE UP (ref 17–32)
CREAT SERPL-MCNC: 0.8 MG/DL — SIGNIFICANT CHANGE UP (ref 0.7–1.5)
D DIMER BLD IA.RAPID-MCNC: 571 NG/ML DDU — HIGH (ref 0–230)
EOSINOPHIL # BLD AUTO: 0.02 K/UL — SIGNIFICANT CHANGE UP (ref 0–0.7)
EOSINOPHIL NFR BLD AUTO: 0.3 % — SIGNIFICANT CHANGE UP (ref 0–8)
GLUCOSE SERPL-MCNC: 138 MG/DL — HIGH (ref 70–99)
HCT VFR BLD CALC: 39.7 % — LOW (ref 42–52)
HGB BLD-MCNC: 14.7 G/DL — SIGNIFICANT CHANGE UP (ref 14–18)
IMM GRANULOCYTES NFR BLD AUTO: 0.9 % — HIGH (ref 0.1–0.3)
LYMPHOCYTES # BLD AUTO: 0.86 K/UL — LOW (ref 1.2–3.4)
LYMPHOCYTES # BLD AUTO: 13.2 % — LOW (ref 20.5–51.1)
MAGNESIUM SERPL-MCNC: 2.4 MG/DL — SIGNIFICANT CHANGE UP (ref 1.8–2.4)
MCHC RBC-ENTMCNC: 35.3 PG — HIGH (ref 27–31)
MCHC RBC-ENTMCNC: 37 G/DL — SIGNIFICANT CHANGE UP (ref 32–37)
MCV RBC AUTO: 95.4 FL — HIGH (ref 80–94)
MONOCYTES # BLD AUTO: 0.2 K/UL — SIGNIFICANT CHANGE UP (ref 0.1–0.6)
MONOCYTES NFR BLD AUTO: 3.1 % — SIGNIFICANT CHANGE UP (ref 1.7–9.3)
NEUTROPHILS # BLD AUTO: 5.35 K/UL — SIGNIFICANT CHANGE UP (ref 1.4–6.5)
NEUTROPHILS NFR BLD AUTO: 82.2 % — HIGH (ref 42.2–75.2)
NRBC # BLD: 0 /100 WBCS — SIGNIFICANT CHANGE UP (ref 0–0)
PLATELET # BLD AUTO: 260 K/UL — SIGNIFICANT CHANGE UP (ref 130–400)
POTASSIUM SERPL-MCNC: 3.7 MMOL/L — SIGNIFICANT CHANGE UP (ref 3.5–5)
POTASSIUM SERPL-SCNC: 3.7 MMOL/L — SIGNIFICANT CHANGE UP (ref 3.5–5)
PROCALCITONIN SERPL-MCNC: 8.02 NG/ML — HIGH (ref 0.02–0.1)
PROT SERPL-MCNC: 5.9 G/DL — LOW (ref 6–8)
RBC # BLD: 4.16 M/UL — LOW (ref 4.7–6.1)
RBC # FLD: 13.9 % — SIGNIFICANT CHANGE UP (ref 11.5–14.5)
SODIUM SERPL-SCNC: 139 MMOL/L — SIGNIFICANT CHANGE UP (ref 135–146)
WBC # BLD: 6.51 K/UL — SIGNIFICANT CHANGE UP (ref 4.8–10.8)
WBC # FLD AUTO: 6.51 K/UL — SIGNIFICANT CHANGE UP (ref 4.8–10.8)

## 2020-04-08 PROCEDURE — 93010 ELECTROCARDIOGRAM REPORT: CPT

## 2020-04-08 PROCEDURE — 99233 SBSQ HOSP IP/OBS HIGH 50: CPT

## 2020-04-08 PROCEDURE — 71045 X-RAY EXAM CHEST 1 VIEW: CPT | Mod: 26

## 2020-04-08 RX ADMIN — AZITHROMYCIN 250 MILLIGRAM(S): 500 TABLET, FILM COATED ORAL at 11:13

## 2020-04-08 RX ADMIN — Medication 650 MILLIGRAM(S): at 00:40

## 2020-04-08 RX ADMIN — Medication 5 MILLIGRAM(S): at 20:41

## 2020-04-08 RX ADMIN — Medication 200 MILLIGRAM(S): at 11:14

## 2020-04-08 RX ADMIN — HEPARIN SODIUM 5000 UNIT(S): 5000 INJECTION INTRAVENOUS; SUBCUTANEOUS at 05:02

## 2020-04-08 RX ADMIN — Medication 200 MILLIGRAM(S): at 20:41

## 2020-04-08 RX ADMIN — CEFTRIAXONE 100 MILLIGRAM(S): 500 INJECTION, POWDER, FOR SOLUTION INTRAMUSCULAR; INTRAVENOUS at 13:44

## 2020-04-08 RX ADMIN — HEPARIN SODIUM 5000 UNIT(S): 5000 INJECTION INTRAVENOUS; SUBCUTANEOUS at 13:45

## 2020-04-08 RX ADMIN — HEPARIN SODIUM 5000 UNIT(S): 5000 INJECTION INTRAVENOUS; SUBCUTANEOUS at 20:41

## 2020-04-08 NOTE — PROGRESS NOTE ADULT - ATTENDING COMMENTS
Sepsis, present on admission secondary to virla Pneumonia secondary to COVID  Suspected superimposed bacterial Pneumonia   Repeat CXR (4/8) Unchanged left greater than right bilateral opacities.   On Plaquenil & azithro x 5 days QTc-446  Started on Rocephin- high procalcitonin   Isolation precautions (contact, airborne, droplet)  Supportive care including  PRN analgesics, anti-tussives, anti-emetics, anti-pyretics  Daily CBC’s and LFT’s   oxygen (NC vs NRR) to maintain Po2 >93%, currently requiring 2 L NC. Will observe on RA today  Acute kidney injury -resolved  continue with  home meds for chronic comorbidities   Discharge Disposition: home with or without oxygen when stable

## 2020-04-08 NOTE — PROGRESS NOTE ADULT - SUBJECTIVE AND OBJECTIVE BOX
Hospital Day:  4d    Subjective:    Patient is a 74y old  Male who presents with a chief complaint of Hypoxia (07 Apr 2020 12:36)    There were no acute overnight events. The patient was seen and examined at the bedside. Patient is much improved today. Still feeling unwell, but fever/chills have resolved. Patient is comfortable at rest, but dyspneic on exertion. Denies abdominal pain, nausea, vomiting, diarrhea.    Past Medical Hx:   Hypertension  Asthma  Asthma with COPD    Past Sx:    Allergies:  No Known Allergies    Current Meds:   Standng Meds:  azithromycin  IVPB      azithromycin  IVPB 250 milliGRAM(s) IV Intermittent every 24 hours  cefTRIAXone   IVPB 1000 milliGRAM(s) IV Intermittent every 24 hours  heparin  Injectable 5000 Unit(s) SubCutaneous every 8 hours  hydroxychloroquine   Oral   hydroxychloroquine 400 milliGRAM(s) Oral every 12 hours  hydroxychloroquine 200 milliGRAM(s) Oral every 12 hours  melatonin 5 milliGRAM(s) Oral at bedtime    PRN Meds:  acetaminophen   Tablet .. 650 milliGRAM(s) Oral every 6 hours PRN Temp greater or equal to 38C (100.4F)  sodium chloride 0.65% Nasal 1 Spray(s) Both Nostrils four times a day PRN Nasal Congestion    HOME MEDICATIONS:  amLODIPine 5 mg oral tablet: 1 tab(s) orally once a day      Vital Signs:   T(F): 98.9 (04-08-20 @ 08:00), Max: 101.6 (04-08-20 @ 00:00)  HR: 85 (04-08-20 @ 08:00) (80 - 98)  BP: 139/65 (04-08-20 @ 08:00) (127/63 - 145/65)  RR: 19 (04-08-20 @ 08:00) (19 - 20)  SpO2: 95% (04-08-20 @ 08:00) (92% - 95%)        Physical Exam:   General: Patient resting comfortably in bed, NAD  HEENT: NCAT, conjunctiva clear, sclera white, PEERLA, EOMI, moist mucous membranes, normal orophaynx  Respiratory: nasal cannula in place; fine inspiratory crackles bilaterally  CV: Normal rate, regular rhythm, normal S1/S2  GI: abdomen soft, non-tender, non-distended  Extremities: no lower extremity edema bilaterally  Neurology: AAOx3, mentating appropriately, follows commands, nonfocal    Labs:                         14.6   8.89  )-----------( 272      ( 07 Apr 2020 04:30 )             40.4       07 Apr 2020 04:30    142    |  102    |  29     ----------------------------<  105    3.9     |  23     |  0.9      Ca    8.6        07 Apr 2020 04:30    TPro  6.2    /  Alb  2.9    /  TBili  0.8    /  DBili  x      /  AST  53     /  ALT  25     /  AlkPhos  81     07 Apr 2020 04:30        Amylase --, Lipase 24, 04-04-20 @ 12:33                    Culture - Blood (collected 04-06-20 @ 11:49)  Source: .Blood None  Preliminary Report (04-07-20 @ 22:01):    No growth to date.        Radiology:   < from: Xray Chest 1 View- PORTABLE-Routine (04.07.20 @ 09:55) >    EXAM:  XR CHEST PORTABLE ROUTINE 1V            PROCEDURE DATE:  04/07/2020            INTERPRETATION:  CLINICAL INDICATION:  covid    COMPARISON: Chest radiograph dated 4/4/2020    TECHNIQUE: Frontal radiograph the chest.    FINDINGS:    Support devices: None.     Cardiac/mediastinum/hilum: Stable.    Lung parenchyma/Pleura: Worsening patchy peripheral bilateral opacities. There is no pneumothorax.    Skeleton/soft tissues: Stable.    IMPRESSION:     Worsening patchy peripheral bilateral opacities.                    JULISSA HARPER M.D., ATTENDING RADIOLOGIST  This document has been electronically signed. Apr 7 2020  2:50PM                < end of copied text >

## 2020-04-08 NOTE — PROGRESS NOTE ADULT - SUBJECTIVE AND OBJECTIVE BOX
I made rounds today with the treatment team including the hospitalist, residents,  nurses and  and discussed the patient's current medical status and discharge  planning needs, and reviewed the chart.    T(C): 37.3 (04-08-20 @ 11:22), Max: 38.7 (04-08-20 @ 00:00)  HR: 88 (04-08-20 @ 11:22) (80 - 98)  BP: 141/65 (04-08-20 @ 11:22) (127/63 - 144/68)  RR: 21 (04-08-20 @ 11:22) (19 - 21)  SpO2: 90% (04-08-20 @ 11:22) (90% - 95%)          I reached out to the patient's health care proxy/ responsible family member-           [     ]  I reached                                     and discussed the patient's medical condition,                   family concerns, and discharge planning           [     ]  I left a message with family               [   x  ]  I personally participated in rounds with the medical team and my resident and discussed the case. My resident reached                   family member/ HCP    significant other/Jackie Mcpherson                            under my direction and supervision  and we reviewed the conversation.          [     ]  My resident left a message with family under my direction and supervision    The following was discussed:  Improving; febrile overnight.  95% sat on 3/ N/C O2. History of hemicolectomy. He has anorexia.   She suggested dietary eval; I added an Ensure supplement twice a day.  He amb with hand hold assist.  He may amb better with a rolling walker.  Friend may be able to take care of him full time upon discharge.  Possible discharge tomorrow.  He may or may not require O2 on discharge.          [     ] I spent 5-10 minutes on the above discussing medical issues with team members and family and/ or my resident    [   x  ] I spent 11-20 minutes on the above discussing medical issues with team members and family and/ or my resident    [     ] I spent 21-30 minutes on the above discussing medical issues with team members and family and/ or my resident

## 2020-04-09 LAB — CRP SERPL-MCNC: 30.1 MG/DL — HIGH (ref 0–0.4)

## 2020-04-09 PROCEDURE — 99233 SBSQ HOSP IP/OBS HIGH 50: CPT

## 2020-04-09 RX ADMIN — HEPARIN SODIUM 5000 UNIT(S): 5000 INJECTION INTRAVENOUS; SUBCUTANEOUS at 15:11

## 2020-04-09 RX ADMIN — CEFTRIAXONE 100 MILLIGRAM(S): 500 INJECTION, POWDER, FOR SOLUTION INTRAMUSCULAR; INTRAVENOUS at 16:24

## 2020-04-09 RX ADMIN — Medication 200 MILLIGRAM(S): at 21:44

## 2020-04-09 RX ADMIN — HEPARIN SODIUM 5000 UNIT(S): 5000 INJECTION INTRAVENOUS; SUBCUTANEOUS at 21:43

## 2020-04-09 RX ADMIN — Medication 5 MILLIGRAM(S): at 21:44

## 2020-04-09 RX ADMIN — AZITHROMYCIN 250 MILLIGRAM(S): 500 TABLET, FILM COATED ORAL at 13:11

## 2020-04-09 RX ADMIN — HEPARIN SODIUM 5000 UNIT(S): 5000 INJECTION INTRAVENOUS; SUBCUTANEOUS at 05:32

## 2020-04-09 RX ADMIN — Medication 200 MILLIGRAM(S): at 10:37

## 2020-04-09 NOTE — PROGRESS NOTE ADULT - SUBJECTIVE AND OBJECTIVE BOX
Hospital Day:  5d    Subjective:    Patient is a 74y old  Male who presents with a chief complaint of Hypoxia (09 Apr 2020 13:43)    There were no acute overnight events. The patient was seen and examined at the bedside. Patient reports feeling unwell. Dyspneic on exertion when walking to the bathroom.     Past Medical Hx:   Hypertension  Asthma  Asthma with COPD    Past Sx:    Allergies:  No Known Allergies    Current Meds:   Standng Meds:  azithromycin  IVPB      azithromycin  IVPB 250 milliGRAM(s) IV Intermittent every 24 hours  cefTRIAXone   IVPB 1000 milliGRAM(s) IV Intermittent every 24 hours  heparin  Injectable 5000 Unit(s) SubCutaneous every 8 hours  hydroxychloroquine   Oral   hydroxychloroquine 400 milliGRAM(s) Oral every 12 hours  hydroxychloroquine 200 milliGRAM(s) Oral every 12 hours  melatonin 5 milliGRAM(s) Oral at bedtime    PRN Meds:  acetaminophen   Tablet .. 650 milliGRAM(s) Oral every 6 hours PRN Temp greater or equal to 38C (100.4F)  sodium chloride 0.65% Nasal 1 Spray(s) Both Nostrils four times a day PRN Nasal Congestion    HOME MEDICATIONS:  amLODIPine 5 mg oral tablet: 1 tab(s) orally once a day      Vital Signs:   T(F): 97.9 (04-09-20 @ 12:00), Max: 99.1 (04-08-20 @ 20:30)  HR: 73 (04-09-20 @ 12:00) (73 - 100)  BP: 151/72 (04-09-20 @ 12:00) (146/67 - 157/72)  RR: 20 (04-09-20 @ 12:00) (18 - 26)  SpO2: 92% (04-09-20 @ 12:00) (92% - 100%)      04-08-20 @ 07:01  -  04-09-20 @ 07:00  --------------------------------------------------------  IN: 540 mL / OUT: 150 mL / NET: 390 mL    04-09-20 @ 07:01  -  04-09-20 @ 13:49  --------------------------------------------------------  IN: 0 mL / OUT: 250 mL / NET: -250 mL        Physical Exam:   General: Patient resting comfortably in bed, NAD  HEENT: NCAT, conjunctiva clear, sclera white, PEERLA, EOMI, moist mucous membranes, normal orophaynx  Respiratory: nasal cannula in place; fine inspiratory crackles bilaterally  CV: Normal rate, regular rhythm, normal S1/S2  GI: abdomen soft, non-tender, non-distended  Extremities: no lower extremity edema bilaterally  Neurology: AAOx3, mentating appropriately, follows commands, nonfocal    Labs:                         14.7   6.51  )-----------( 260      ( 08 Apr 2020 07:05 )             39.7       08 Apr 2020 07:05    139    |  102    |  20     ----------------------------<  138    3.7     |  21     |  0.8      Ca    8.3        08 Apr 2020 07:05  Mg     2.4       08 Apr 2020 11:20    TPro  5.9    /  Alb  2.9    /  TBili  0.8    /  DBili  x      /  AST  76     /  ALT  44     /  AlkPhos  126    08 Apr 2020 07:05                          Culture - Blood (collected 04-06-20 @ 11:49)  Source: .Blood None  Preliminary Report (04-07-20 @ 22:01):    No growth to date.        Radiology:   < from: Xray Chest 1 View- PORTABLE-Routine (04.08.20 @ 08:34) >    EXAM:  XR CHEST PORTABLE ROUTINE 1V            PROCEDURE DATE:  04/08/2020            INTERPRETATION:  CLINICAL INDICATION:  Pneumonia    COMPARISON: Chest radiograph dated 4/7/2020    TECHNIQUE: Frontal radiograph the chest. Rotated exam     FINDINGS:    Support devices: None.     Cardiac/mediastinum/hilum: Stable.    Lung parenchyma/Pleura: Unchanged left greater than right bilateral opacities. There is no pneumothorax.    Skeleton/soft tissues: Stable.    IMPRESSION:     Unchanged left greater than right bilateral opacities.                    JULISSA HARPER M.D., ATTENDING RADIOLOGIST  This document has been electronically signed. Apr 8 2020 10:30AM                < end of copied text >

## 2020-04-09 NOTE — PROGRESS NOTE ADULT - ASSESSMENT
73 yo male with PMH of Asthma and HTN, presents with c/o weakness, sob and cough.  Patient states he has been having fever and progressive cough and sob for 2 weeks. Currently admitted for     # Sepsis and Acute Hypoxic Respiratory Failure secondary to COVID19 with possible superimposed bacterial pneumonia  - COVID19 PCR positive: 20  - On admission, patient febrile to 104, saturating 95% on 4L O2 via NC; labs significant for lymphopenia  - Admission CXR () shows peripheral opacities  - Repeat CXR () shows worsening peripheral opacities; now () with stable peripheral opacities and possible developing right-sided lobar opacity  - Patient met sepsis criteria on  (fever to 101.4 and WBC 15.8) and  (fever to 101.3 and tachycardia to 101); sepsis workup was initiated; now resolved   - Patient currently afebrile; saturating 94% on 2L O2 via NC; will trial room air today  - leukocytosis now resolved: 11.48 > 15.8 > 8.89  - Procalcitonin: 11.8 > 8.02  - CRP pending, will follow up result  - BCx (): no growth  - s/p Plaquenil 400mg q12h loading dose x1 day  - continue Plaquenil 200mg po q12h (day 4 of )  - ID on board  - a/p Azithromycin 500mg PO loading dose x1  - continue Azithromycin 250mg PO (day 1 of )  - QTc: 446  - Per ID's recommendation, patient was started on ceftriaxone yesterday () as he had a low grade fever in the morning  - continue Ceftriaxone 1g IV daily for now  - Patient not requiring increasing O2 supplementation; will hold off on repeat CXR for now  - Continue oxygen supplementation PRN to keep SaO2 88-92%  - Tylenol PRN for fever     # ELVIRA - resolving  - Creatinine improvin.5 > 1.0 > 1.1 > 0.9  - follow up UA, urine studies  - Follow BMP    # Hyponatremia   - resolved    # HTN  - BP meds on hold for now    #DVT ppx: heparin subq  #GI ppx: not indicated  #Activity: as tolerated  #Diet: DASH  #Dispo: pending improvement in O2 requirement 73 yo male with PMH of Asthma and HTN, presents with c/o weakness, sob and cough.  Patient states he has been having fever and progressive cough and sob for 2 weeks. Currently admitted for     # Sepsis and Acute Hypoxic Respiratory Failure secondary to COVID19 with possible superimposed bacterial pneumonia  - COVID19 PCR positive: 4/4/20  - On admission, patient febrile to 104, saturating 95% on 4L O2 via NC; labs significant for lymphopenia  - Admission CXR (4/4) shows peripheral opacities  - Repeat CXR (4/7) shows worsening peripheral opacities; now (4/8) with stable peripheral opacities and possible developing right-sided lobar opacity  - Patient met sepsis criteria on 4/5 (fever to 101.4 and WBC 15.8) and 4/6 (fever to 101.3 and tachycardia to 101); sepsis workup was initiated; now resolved   - Patient currently afebrile; saturating 92% on RA  - leukocytosis now resolved: 11.48 > 15.8 > 8.89  - Procalcitonin: 11.8 > 8.02  - CRP: 30.36 > 30.1  - D-Dimer: 752 > 571  - BCx (4/6): no growth  - s/p Plaquenil 400mg q12h loading dose x1 day  - continue Plaquenil 200mg po q12h (day 4 of 4)  - ID on board  - a/p Azithromycin 500mg PO loading dose x1  - continue Azithromycin 250mg PO (day 2 of 4)  - QTc: 446  - Per ID's recommendation, patient was started on ceftriaxone yesterday (4/7) as he had a low grade fever in the morning  - continue Ceftriaxone 1g IV daily for now  - Patient not requiring increasing O2 supplementation; will hold off on repeat CXR for now  - Continue oxygen supplementation PRN to keep SaO2 88-92%  - Tylenol PRN for fever     # ELVIRA - resolved  - Creatinine now 0.8    # Hyponatremia   - resolved    # HTN  - BP meds on hold for now    #DVT ppx: heparin subq  #GI ppx: not indicated  #Activity: as tolerated  #Diet: DASH  #Dispo: possible DC

## 2020-04-09 NOTE — PROGRESS NOTE ADULT - SUBJECTIVE AND OBJECTIVE BOX
Progress Note:  Provider Speciality                            Hospitalist      MARSHAL FULTON [id number] 74y Male     CHIEF PRESENTING COMPLAINT:  Patient is a 74y old  Male who presents with a chief complaint of Hypoxia (09 Apr 2020 12:35)        SUBJECTIVE:  Patient was seen and examined at bedside. Reports continued dyspnea  . No significant overnight events reported.     HISTORY OF PRESENTING ILLNESS:  HPI:  73 yo male with PMH of Asthma and HTN, presents with c/o weakness, sob and cough.  Patient states he has been having cough and sob x 2 weeks that has been getting progressively worse. He also states that he has been having fever that are getting worse. He endorses that his wife has tested positive for COVID 19. He denies any other symptoms. (04 Apr 2020 18:13)        REVIEW OF SYSTEMS:  Patient denies any headache, any vision complaints, runny nose, fever, chills, sore throat. Denies chest pain, palpitation. Denies nausea, vomiting, abdominal pain, diarrhoea, Denies urinary burning, urgency, frequency, dysuria. Denies weakness in any part of the body or numbness.   At least 10 systems were reviewed in ROS. All systems reviewed  are within normal limits except for the complaints as described in Subjective.    PAST MEDICAL & SURGICAL HISTORY:  PAST MEDICAL & SURGICAL HISTORY:  Hypertension  Asthma          VITAL SIGNS:  Vital Signs Last 24 Hrs  T(C): 36.6 (09 Apr 2020 12:00), Max: 37.3 (08 Apr 2020 20:30)  T(F): 97.9 (09 Apr 2020 12:00), Max: 99.1 (08 Apr 2020 20:30)  HR: 73 (09 Apr 2020 12:00) (73 - 100)  BP: 151/72 (09 Apr 2020 12:00) (146/67 - 157/72)  BP(mean): --  RR: 20 (09 Apr 2020 12:00) (18 - 26)  SpO2: 92% (09 Apr 2020 12:00) (92% - 100%)          PHYSICAL EXAMINATION:  In mild respiratory distress  General: No pallor, no icterus  HEENT:   EOMI, no JVD,.  Heart: S1+S2 audible  Lungs: bilateral  reduced air entry, no wheezing, no crepitations.  Abdomen: Soft, non-tender, non-distended , no  rigidity or guarding.  CNS: AAOx3, CN  grossly intact.  Extremities:  No edema            CONSULTS:  Consultant(s) Notes Reviewed by me.   Care Discussed with Consultants/Other Providers where required.        MEDICATIONS:  MEDICATIONS  (STANDING):  azithromycin  IVPB      azithromycin  IVPB 250 milliGRAM(s) IV Intermittent every 24 hours  cefTRIAXone   IVPB 1000 milliGRAM(s) IV Intermittent every 24 hours  heparin  Injectable 5000 Unit(s) SubCutaneous every 8 hours  hydroxychloroquine   Oral   hydroxychloroquine 400 milliGRAM(s) Oral every 12 hours  hydroxychloroquine 200 milliGRAM(s) Oral every 12 hours  melatonin 5 milliGRAM(s) Oral at bedtime    MEDICATIONS  (PRN):  acetaminophen   Tablet .. 650 milliGRAM(s) Oral every 6 hours PRN Temp greater or equal to 38C (100.4F)  sodium chloride 0.65% Nasal 1 Spray(s) Both Nostrils four times a day PRN Nasal Congestion            ASSESSMENT:    Sepsis, present on admission secondary to virla Pneumonia secondary to COVID  Suspected superimposed bacterial Pneumonia   Repeat CXR (4/8) Unchanged left greater than right bilateral opacities.   On Plaquenil & azithro x 5 days QTc-446  Started on Rocephin- high procalcitonin   Isolation precautions (contact, airborne, droplet)  Supportive care including  PRN analgesics, anti-tussives, anti-emetics, anti-pyretics  Daily CBC’s and LFT’s   oxygen (NC vs NRR) to maintain Po2 >93%, currently requiring 2 L NC. Will observe on RA today  Acute kidney injury -resolved  continue with  home meds for chronic comorbidities   Declined to participate with PT as he was feeling too sick.  Discharge Disposition: home with or without oxygen when stable .

## 2020-04-09 NOTE — PHYSICAL THERAPY INITIAL EVALUATION ADULT - SPECIFY REASON(S)
Pt's chart reviewed & attempted to see pt for b/s PT IE/tx. Pt declined for b/s PT IE/tx at this time stating " I am too sick right now. May be some other time."

## 2020-04-09 NOTE — PROGRESS NOTE ADULT - SUBJECTIVE AND OBJECTIVE BOX
I made rounds today with the treatment team including the hospitalist, residents,  nurses and  and discussed the patient's current medical status and discharge  planning needs, and reviewed the chart.    T(C): 36.6 (04-09-20 @ 12:00), Max: 37.3 (04-08-20 @ 20:30)  HR: 73 (04-09-20 @ 12:00) (73 - 100)  BP: 151/72 (04-09-20 @ 12:00) (146/67 - 157/72)  RR: 20 (04-09-20 @ 12:00) (18 - 26)  SpO2: 92% (04-09-20 @ 12:00) (92% - 100%)          I reached out to the patient's health care proxy/ responsible family member-           [    ]  I reached                                    and discussed the patient's medical condition,                   family concerns, and discharge planning           [     ]  I left a message with family               [  x   ]  I personally participated in rounds with the medical team and my resident and discussed the case. My resident reached                   family member/ HCP   significant other/Jackie              under my direction and supervision  and we reviewed the conversation.          [     ]  My resident left a message with family under my direction and supervision    The following was discussed:  95% on 2L N/C.  Trial on RA.  Afebrile.  PT to see.    [     ] I spent 5-10 minutes on the above discussing medical issues with team members and family and/ or my resident    [ x    ] I spent 11-20 minutes on the above discussing medical issues with team members and family and/ or my resident    [     ] I spent 21-30 minutes on the above discussing medical issues with team members and family and/ or my resident

## 2020-04-10 ENCOUNTER — TRANSCRIPTION ENCOUNTER (OUTPATIENT)
Age: 75
End: 2020-04-10

## 2020-04-10 VITALS
SYSTOLIC BLOOD PRESSURE: 122 MMHG | RESPIRATION RATE: 22 BRPM | DIASTOLIC BLOOD PRESSURE: 59 MMHG | HEART RATE: 98 BPM | TEMPERATURE: 99 F | OXYGEN SATURATION: 91 %

## 2020-04-10 LAB
CULTURE RESULTS: SIGNIFICANT CHANGE UP
SPECIMEN SOURCE: SIGNIFICANT CHANGE UP

## 2020-04-10 PROCEDURE — 99239 HOSP IP/OBS DSCHRG MGMT >30: CPT

## 2020-04-10 RX ORDER — CEFPODOXIME PROXETIL 100 MG
1 TABLET ORAL
Qty: 14 | Refills: 0
Start: 2020-04-10 | End: 2020-04-16

## 2020-04-10 RX ORDER — ACETAMINOPHEN 500 MG
2 TABLET ORAL
Qty: 0 | Refills: 0 | DISCHARGE
Start: 2020-04-10

## 2020-04-10 RX ORDER — AZITHROMYCIN 500 MG/1
1 TABLET, FILM COATED ORAL
Qty: 4 | Refills: 0
Start: 2020-04-10 | End: 2020-04-11

## 2020-04-10 RX ADMIN — HEPARIN SODIUM 5000 UNIT(S): 5000 INJECTION INTRAVENOUS; SUBCUTANEOUS at 14:44

## 2020-04-10 RX ADMIN — AZITHROMYCIN 250 MILLIGRAM(S): 500 TABLET, FILM COATED ORAL at 14:43

## 2020-04-10 RX ADMIN — HEPARIN SODIUM 5000 UNIT(S): 5000 INJECTION INTRAVENOUS; SUBCUTANEOUS at 05:41

## 2020-04-10 NOTE — PHYSICAL THERAPY INITIAL EVALUATION ADULT - GENERAL OBSERVATIONS, REHAB EVAL
Pt encountered in the bed, on RA SPO2 @ 93%, agreeable for b/s PT IE/tx,  Nallely. fairly well to tx. Pt was seen from [phone]. Pt left in the bed post tx all needs within reach.

## 2020-04-10 NOTE — DISCHARGE NOTE PROVIDER - PROVIDER TOKENS
FREE:[LAST:[Gurvinder],FIRST:[Bettieius],PHONE:[phone]],FAX:[(   [phone]    -],ADDRESS:[[address] [place] [address]],FOLLOWUP:[Routine]]

## 2020-04-10 NOTE — DISCHARGE NOTE PROVIDER - NSDCMRMEDTOKEN_GEN_ALL_CORE_FT
acetaminophen 325 mg oral tablet: 2 tab(s) orally every 6 hours, As needed, Temp greater or equal to 38C (100.4F)  amLODIPine 5 mg oral tablet: 1 tab(s) orally once a day acetaminophen 325 mg oral tablet: 2 tab(s) orally every 6 hours, As needed, Temp greater or equal to 38C (100.4F)  amLODIPine 5 mg oral tablet: 1 tab(s) orally once a day  cefpodoxime 200 mg oral tablet: 1 tab(s) orally 2 times a day

## 2020-04-10 NOTE — DISCHARGE NOTE NURSING/CASE MANAGEMENT/SOCIAL WORK - PATIENT PORTAL LINK FT
You can access the FollowMyHealth Patient Portal offered by [facility] by registering at the following website: [email or web address]. By joining [employer]’s FollowMyHealth portal, you will also be able to view your health information using other applications (apps) compatible with our system.

## 2020-04-10 NOTE — PHYSICAL THERAPY INITIAL EVALUATION ADULT - LEVEL OF INDEPENDENCE: GAIT, REHAB EVAL
close supervision overall except CG with turns 2* LOB which is self regained by PT/supervision/contact guard

## 2020-04-10 NOTE — DISCHARGE NOTE PROVIDER - NSDCCPCAREPLAN_GEN_ALL_CORE_FT
PRINCIPAL DISCHARGE DIAGNOSIS  Diagnosis: Pneumonia due to 2019 novel coronavirus  Assessment and Plan of Treatment: The symptoms you have been experiencing are due to infection with the novel cornavirus and subsequent development of COVID-19. Symptoms of the illness include fever, malaise, dry cough, and occasionally diarrhea, headache, loss of appetite, and disturbances in taste/smell. The virus is spread through respiratory droplets and contact with contaminated items/surfaces.  Your oxygen requirements have improved such that you no longer require supplemental oxygen, and you are currently medically cleared for discharge. Please continue to self-quarantine until 4/18. Wash your hands thoroughly (for at least 20 seconds with soap and warm water) and frequently. Avoid touching your face and cover your cough/sneeze.  Please seek IMMEDIATE medical attention if you experience increasing shortness of breath / difficulty breathing, chest pain, palpitations, if you are unable to keep down food/fluids, or if you experience dizziness, or fainting.

## 2020-04-10 NOTE — PROGRESS NOTE ADULT - SUBJECTIVE AND OBJECTIVE BOX
Progress Note:  Provider Speciality                            Hospitalist      MARSHAL FULTON [id number] 74y Male     CHIEF PRESENTING COMPLAINT:  Patient is a 74y old  Male who presents with a chief complaint of Hypoxia (09 Apr 2020 12:35)        SUBJECTIVE:  Patient was seen and examined at bedside. Reports some improvement in  dyspnea  . No significant overnight events reported.     HISTORY OF PRESENTING ILLNESS:  HPI:  75 yo male with PMH of Asthma and HTN, presents with c/o weakness, sob and cough.  Patient states he has been having cough and sob x 2 weeks that has been getting progressively worse. He also states that he has been having fever that are getting worse. He endorses that his wife has tested positive for COVID 19. He denies any other symptoms. (04 Apr 2020 18:13)        REVIEW OF SYSTEMS:  Patient denies any headache, any vision complaints, runny nose, fever, chills, sore throat. Denies chest pain, palpitation. Denies nausea, vomiting, abdominal pain, diarrhoea, Denies urinary burning, urgency, frequency, dysuria. Denies weakness in any part of the body or numbness.   At least 10 systems were reviewed in ROS. All systems reviewed  are within normal limits except for the complaints as described in Subjective.    PAST MEDICAL & SURGICAL HISTORY:  PAST MEDICAL & SURGICAL HISTORY:  Hypertension  Asthma          VITAL SIGNS:  Vital Signs Last 24 Hrs  T(C): 37.8 (10 Apr 2020 08:30), Max: 38.2 (09 Apr 2020 20:30)  T(F): 100.1 (10 Apr 2020 08:30), Max: 100.7 (09 Apr 2020 20:30)  HR: 90 (10 Apr 2020 08:30) (84 - 90)  BP: 142/65 (10 Apr 2020 08:30) (133/64 - 149/67)  BP(mean): --  RR: 21 (10 Apr 2020 08:30) (18 - 22)  SpO2: 91% (10 Apr 2020 08:30) (90% - 95%)        PHYSICAL EXAMINATION:  In mild respiratory distress  General: No pallor, no icterus  HEENT:   EOMI, no JVD,.  Heart: S1+S2 audible  Lungs: bilateral  reduced air entry, no wheezing, no crepitations.  Abdomen: Soft, non-tender, non-distended , no  rigidity or guarding.  CNS: AAOx3, CN  grossly intact.  Extremities:  No edema            CONSULTS:  Consultant(s) Notes Reviewed by me.   Care Discussed with Consultants/Other Providers where required.        MEDICATIONS:  MEDICATIONS  (STANDING):  azithromycin  IVPB      azithromycin  IVPB 250 milliGRAM(s) IV Intermittent every 24 hours  cefTRIAXone   IVPB 1000 milliGRAM(s) IV Intermittent every 24 hours  heparin  Injectable 5000 Unit(s) SubCutaneous every 8 hours  hydroxychloroquine   Oral   hydroxychloroquine 400 milliGRAM(s) Oral every 12 hours  hydroxychloroquine 200 milliGRAM(s) Oral every 12 hours  melatonin 5 milliGRAM(s) Oral at bedtime    MEDICATIONS  (PRN):  acetaminophen   Tablet .. 650 milliGRAM(s) Oral every 6 hours PRN Temp greater or equal to 38C (100.4F)  sodium chloride 0.65% Nasal 1 Spray(s) Both Nostrils four times a day PRN Nasal Congestion            ASSESSMENT:    Sepsis, present on admission secondary to virla Pneumonia secondary to COVID  Suspected superimposed bacterial Pneumonia   Repeat CXR (4/8) Unchanged left greater than right bilateral opacities.   On Plaquenil & azithro x 5 days QTc-446  Started on Rocephin- high procalcitonin   Isolation precautions (contact, airborne, droplet)  Supportive care including  PRN analgesics, anti-tussives, anti-emetics, anti-pyretics  Daily CBC’s and LFT’s   oxygen (NC vs NRR) to maintain Po2 >93%, currently requiring 2 L NC. On RA 91% today. Will ambulate to see the need for Oxygen upon discharge  Acute kidney injury -resolved  continue with  home meds for chronic comorbidities   Declined to participate with PT  yesterday as he was feeling too sick. Get repeat PT today as he is willing  Discharge Disposition: home with or without oxygen when pending ambulation with PT Progress Note:  Provider Speciality                            Hospitalist      MARSHAL FULTON [id number] 74y Male     CHIEF PRESENTING COMPLAINT:  Patient is a 74y old  Male who presents with a chief complaint of Hypoxia (09 Apr 2020 12:35)        SUBJECTIVE:  Patient was seen and examined at bedside. Reports some improvement in  dyspnea  . No significant overnight events reported.     HISTORY OF PRESENTING ILLNESS:  HPI:  75 yo male with PMH of Asthma and HTN, presents with c/o weakness, sob and cough.  Patient states he has been having cough and sob x 2 weeks that has been getting progressively worse. He also states that he has been having fever that are getting worse. He endorses that his wife has tested positive for COVID 19. He denies any other symptoms. (04 Apr 2020 18:13)        REVIEW OF SYSTEMS:  Patient denies any headache, any vision complaints, runny nose, fever, chills, sore throat. Denies chest pain, palpitation. Denies nausea, vomiting, abdominal pain, diarrhoea, Denies urinary burning, urgency, frequency, dysuria. Denies weakness in any part of the body or numbness.   At least 10 systems were reviewed in ROS. All systems reviewed  are within normal limits except for the complaints as described in Subjective.    PAST MEDICAL & SURGICAL HISTORY:  PAST MEDICAL & SURGICAL HISTORY:  Hypertension  Asthma          VITAL SIGNS:  Vital Signs Last 24 Hrs  T(C): 37.8 (10 Apr 2020 08:30), Max: 38.2 (09 Apr 2020 20:30)  T(F): 100.1 (10 Apr 2020 08:30), Max: 100.7 (09 Apr 2020 20:30)  HR: 90 (10 Apr 2020 08:30) (84 - 90)  BP: 142/65 (10 Apr 2020 08:30) (133/64 - 149/67)  BP(mean): --  RR: 21 (10 Apr 2020 08:30) (18 - 22)  SpO2: 91% (10 Apr 2020 08:30) (90% - 95%)        PHYSICAL EXAMINATION:  In mild respiratory distress  General: No pallor, no icterus  HEENT:   EOMI, no JVD,.  Heart: S1+S2 audible  Lungs: bilateral  reduced air entry, no wheezing, no crepitations.  Abdomen: Soft, non-tender, non-distended , no  rigidity or guarding.  CNS: AAOx3, CN  grossly intact.  Extremities:  No edema            CONSULTS:  Consultant(s) Notes Reviewed by me.   Care Discussed with Consultants/Other Providers where required.        MEDICATIONS:  MEDICATIONS  (STANDING):  azithromycin  IVPB      azithromycin  IVPB 250 milliGRAM(s) IV Intermittent every 24 hours  cefTRIAXone   IVPB 1000 milliGRAM(s) IV Intermittent every 24 hours  heparin  Injectable 5000 Unit(s) SubCutaneous every 8 hours  hydroxychloroquine   Oral   hydroxychloroquine 400 milliGRAM(s) Oral every 12 hours  hydroxychloroquine 200 milliGRAM(s) Oral every 12 hours  melatonin 5 milliGRAM(s) Oral at bedtime    MEDICATIONS  (PRN):  acetaminophen   Tablet .. 650 milliGRAM(s) Oral every 6 hours PRN Temp greater or equal to 38C (100.4F)  sodium chloride 0.65% Nasal 1 Spray(s) Both Nostrils four times a day PRN Nasal Congestion            ASSESSMENT:    Sepsis, present on admission secondary to virla Pneumonia secondary to COVID  Suspected superimposed bacterial Pneumonia   Repeat CXR (4/8) Unchanged left greater than right bilateral opacities.   On Plaquenil & azithro x 5 days QTc-446  Started on Rocephin- high procalcitonin   Isolation precautions (contact, airborne, droplet)  Supportive care including  PRN analgesics, anti-tussives, anti-emetics, anti-pyretics  Daily CBC’s and LFT’s   oxygen (NC vs NRR) to maintain Po2 >90%, currently requiring 2 L NC. On RA 91% today. Will ambulate to see the need for Oxygen upon discharge  Acute kidney injury -resolved  continue with  home meds for chronic comorbidities   Declined to participate with PT  yesterday as he was feeling too sick. Get repeat PT today as he is willing  Discharge Disposition: home with or without oxygen when pending ambulation with PT Progress Note:  Provider Speciality                            Hospitalist      MARSHAL FULTON [id number] 74y Male     CHIEF PRESENTING COMPLAINT:  Patient is a 74y old  Male who presents with a chief complaint of Hypoxia (09 Apr 2020 12:35)        SUBJECTIVE:  Patient was seen and examined at bedside. Reports some improvement in  dyspnea  . No significant overnight events reported.     HISTORY OF PRESENTING ILLNESS:  HPI:  73 yo male with PMH of Asthma and HTN, presents with c/o weakness, sob and cough.  Patient states he has been having cough and sob x 2 weeks that has been getting progressively worse. He also states that he has been having fever that are getting worse. He endorses that his wife has tested positive for COVID 19. He denies any other symptoms. (04 Apr 2020 18:13)        REVIEW OF SYSTEMS:  Patient denies any headache, any vision complaints, runny nose, fever, chills, sore throat. Denies chest pain, palpitation. Denies nausea, vomiting, abdominal pain, diarrhoea, Denies urinary burning, urgency, frequency, dysuria. Denies weakness in any part of the body or numbness.   At least 10 systems were reviewed in ROS. All systems reviewed  are within normal limits except for the complaints as described in Subjective.    PAST MEDICAL & SURGICAL HISTORY:  PAST MEDICAL & SURGICAL HISTORY:  Hypertension  Asthma          VITAL SIGNS:  Vital Signs Last 24 Hrs  T(C): 37.8 (10 Apr 2020 08:30), Max: 38.2 (09 Apr 2020 20:30)  T(F): 100.1 (10 Apr 2020 08:30), Max: 100.7 (09 Apr 2020 20:30)  HR: 90 (10 Apr 2020 08:30) (84 - 90)  BP: 142/65 (10 Apr 2020 08:30) (133/64 - 149/67)  BP(mean): --  RR: 21 (10 Apr 2020 08:30) (18 - 22)  SpO2: 91% (10 Apr 2020 08:30) (90% - 95%)        PHYSICAL EXAMINATION:  In mild respiratory distress  General: No pallor, no icterus  HEENT:   EOMI, no JVD,.  Heart: S1+S2 audible  Lungs: bilateral  reduced air entry, no wheezing, no crepitations.  Abdomen: Soft, non-tender, non-distended , no  rigidity or guarding.  CNS: AAOx3, CN  grossly intact.  Extremities:  No edema            CONSULTS:  Consultant(s) Notes Reviewed by me.   Care Discussed with Consultants/Other Providers where required.        MEDICATIONS:  MEDICATIONS  (STANDING):  azithromycin  IVPB      azithromycin  IVPB 250 milliGRAM(s) IV Intermittent every 24 hours  cefTRIAXone   IVPB 1000 milliGRAM(s) IV Intermittent every 24 hours  heparin  Injectable 5000 Unit(s) SubCutaneous every 8 hours  hydroxychloroquine   Oral   hydroxychloroquine 400 milliGRAM(s) Oral every 12 hours  hydroxychloroquine 200 milliGRAM(s) Oral every 12 hours  melatonin 5 milliGRAM(s) Oral at bedtime    MEDICATIONS  (PRN):  acetaminophen   Tablet .. 650 milliGRAM(s) Oral every 6 hours PRN Temp greater or equal to 38C (100.4F)  sodium chloride 0.65% Nasal 1 Spray(s) Both Nostrils four times a day PRN Nasal Congestion            ASSESSMENT:    Sepsis, present on admission secondary to virla Pneumonia secondary to COVID  Suspected superimposed bacterial Pneumonia   Repeat CXR (4/8) Unchanged left greater than right bilateral opacities.   On Plaquenil & azithro x 5 days QTc-446  Started on Rocephin- high procalcitonin   Isolation precautions (contact, airborne, droplet)  Supportive care including  PRN analgesics, anti-tussives, anti-emetics, anti-pyretics  Daily CBC’s and LFT’s   oxygen (NC vs NRR) to maintain Po2 >90%, currently requiring 2 L NC. On RA 91% today. Will ambulate to see the need for Oxygen upon discharge  Acute kidney injury -resolved  continue with  home meds for chronic comorbidities   Declined to participate with PT  yesterday as he was feeling too sick. Get repeat PT today as he is willing  Discharge Disposition: home with or without oxygen when pending ambulation with PT , Vantin 200 milligrams every 12 hrs x 7 days on discharge

## 2020-04-10 NOTE — DISCHARGE NOTE PROVIDER - CARE PROVIDER_API CALL
Jeannine Hollins  [address] [facility] [address] [place] [address]  Phone: [phone]  Fax: (   )    -  Follow Up Time: Routine

## 2020-04-10 NOTE — DISCHARGE NOTE PROVIDER - HOSPITAL COURSE
75 yo male with PMH of Asthma and HTN, presents with c/o weakness, sob and cough.  Patient states he has been having fever and progressive cough and sob for 2 weeks. Currently admitted for         # Sepsis and Acute Hypoxic Respiratory Failure secondary to COVID19 with possible superimposed bacterial pneumonia    - COVID19 PCR positive: 4/4/20    - On admission, patient febrile to 104, saturating 95% on 4L O2 via NC; labs significant for lymphopenia    - Admission CXR (4/4) shows peripheral opacities    - Repeat CXR (4/7) shows worsening peripheral opacities; now (4/8) with stable peripheral opacities and possible developing right-sided lobar opacity    - Patient met sepsis criteria on 4/5 (fever to 101.4 and WBC 15.8) and 4/6 (fever to 101.3 and tachycardia to 101); sepsis workup was initiated; now resolved     - Patient had low grade fever last night (100.7); saturating 92% on RA; was able to ambulate with PT; remained 92% after ambulation to the bathroom    - leukocytosis now resolved    - Procalcitonin, CRP, D-Dimer now downtrending     - BCx (4/6): no growth    - s/p Plaquenil    - a/p Azithromycin 500mg PO loading dose x1    - continue Azithromycin 250mg PO (day 3 of 4)    - Patient was started on ceftriaxone (4/7) per ID's recommendation    - Will discharge home on Vantin 200mg q12h x7 days    - Tylenol PRN for fever         # ELVIRA - resolved    - Creatinine now 0.8        # Hyponatremia     - resolved        # HTN    - Resume home meds        #Discharge home

## 2020-04-10 NOTE — PROGRESS NOTE ADULT - SUBJECTIVE AND OBJECTIVE BOX
I made rounds today with the treatment team including the hospitalist, residents,  nurses and  and discussed the patient's current medical status and discharge  planning needs, and reviewed the chart.    T(C): 37.7 (04-10-20 @ 12:16), Max: 38.2 (04-09-20 @ 20:30)  HR: 98 (04-10-20 @ 12:16) (84 - 98)  BP: 140/66 (04-10-20 @ 12:16) (133/64 - 149/67)  RR: 20 (04-10-20 @ 12:16) (18 - 22)  SpO2: 92% (04-10-20 @ 12:16) (90% - 95%)          I reached out to the patient's health care proxy/ responsible family member-           [     ]  I reached                                     and discussed the patient's medical condition,                   family concerns, and discharge planning           [     ]  I left a message with family               [  x   ]  I personally participated in rounds with the medical team and my resident and discussed the case. My resident reached                   family member/ HCP  signif other/ Jackie                              under my direction and supervision  and we reviewed the conversaion.          [     ]  My resident left a message with family under my direction and supervision    The following was discussed:  Still spiking fever.  Possible Discharge  D/C instructions given.  PT to see.        [     ] I spent 5-10 minutes on the above discussing medical issues with team members and family and/ or my resident    [ x    ] I spent 11-20 minutes on the above discussing medical issues with team members and family and/ or my resident    [     ] I spent 21-30 minutes on the above discussing medical issues with team members and family and/ or my resident

## 2020-04-10 NOTE — PROGRESS NOTE ADULT - REASON FOR ADMISSION
Hypoxia

## 2020-04-10 NOTE — PHYSICAL THERAPY INITIAL EVALUATION ADULT - CRITERIA FOR SKILLED THERAPEUTIC INTERVENTIONS
rehab potential/functional limitations in following categories/impairments found/anticipated discharge recommendation

## 2020-04-10 NOTE — CHART NOTE - NSCHARTNOTEFT_GEN_A_CORE
<<<RESIDENT DISCHARGE NOTE>>>     MARSHAL FULTON  MRN-[id number]    VITAL SIGNS:  T(F): 99.8 (04-10-20 @ 12:16), Max: 100.7 (04-09-20 @ 20:30)  HR: 98 (04-10-20 @ 12:16)  BP: 140/66 (04-10-20 @ 12:16)  SpO2: 92% (04-10-20 @ 12:16)      PHYSICAL EXAMINATION:  General: Patient resting in bed, NAD  HEENT: NCAT, conjunctiva clear, sclera white  Respiratory: on room air; fine inspiratory crackles bilaterally  CV: Normal rate, regular rhythm, normal S1/S2  GI: abdomen soft, non-tender, non-distended  Extremities: no lower extremity edema bilaterally  Neurology: AAOx3, mentating appropriately, follows commands, nonfocal        FINAL DISCHARGE INTERVIEW:  Resident(s) Present: (Name: Madina Lane)    DISCHARGE MEDICATION RECONCILIATION  reviewed with Attending (Name: Frank Castillo)    DISPOSITION:   [ x ] Home,    [  ] Home with Visiting Nursing Services,   [  ]  SNF/ NH,    [   ] Acute Rehab (4A),   [   ] Other (Specify:_________)

## 2020-04-18 DIAGNOSIS — E87.2 ACIDOSIS: ICD-10-CM

## 2020-04-18 DIAGNOSIS — E87.1 HYPO-OSMOLALITY AND HYPONATREMIA: ICD-10-CM

## 2020-04-18 DIAGNOSIS — J96.01 ACUTE RESPIRATORY FAILURE WITH HYPOXIA: ICD-10-CM

## 2020-04-18 DIAGNOSIS — R09.02 HYPOXEMIA: ICD-10-CM

## 2020-04-18 DIAGNOSIS — A41.89 OTHER SPECIFIED SEPSIS: ICD-10-CM

## 2020-04-18 DIAGNOSIS — I10 ESSENTIAL (PRIMARY) HYPERTENSION: ICD-10-CM

## 2020-04-18 DIAGNOSIS — U07.1 COVID-19: ICD-10-CM

## 2020-04-18 DIAGNOSIS — N17.9 ACUTE KIDNEY FAILURE, UNSPECIFIED: ICD-10-CM

## 2020-04-18 DIAGNOSIS — J44.0 CHRONIC OBSTRUCTIVE PULMONARY DISEASE WITH (ACUTE) LOWER RESPIRATORY INFECTION: ICD-10-CM

## 2020-04-18 DIAGNOSIS — Z87.891 PERSONAL HISTORY OF NICOTINE DEPENDENCE: ICD-10-CM

## 2020-04-18 DIAGNOSIS — R65.20 SEVERE SEPSIS WITHOUT SEPTIC SHOCK: ICD-10-CM

## 2020-04-18 DIAGNOSIS — D64.9 ANEMIA, UNSPECIFIED: ICD-10-CM

## 2020-04-18 DIAGNOSIS — J45.909 UNSPECIFIED ASTHMA, UNCOMPLICATED: ICD-10-CM

## 2020-04-18 DIAGNOSIS — J15.9 UNSPECIFIED BACTERIAL PNEUMONIA: ICD-10-CM

## 2020-04-18 DIAGNOSIS — J12.89 OTHER VIRAL PNEUMONIA: ICD-10-CM

## 2023-06-05 PROBLEM — J45.909 UNSPECIFIED ASTHMA, UNCOMPLICATED: Chronic | Status: ACTIVE | Noted: 2020-04-04

## 2023-06-05 PROBLEM — I10 ESSENTIAL (PRIMARY) HYPERTENSION: Chronic | Status: ACTIVE | Noted: 2020-04-04

## 2023-07-28 ENCOUNTER — APPOINTMENT (OUTPATIENT)
Dept: OTOLARYNGOLOGY | Facility: CLINIC | Age: 78
End: 2023-07-28

## 2024-04-14 NOTE — ED ADULT NURSE NOTE - NS ED NURSE RECORD ANOTHER VITAL SIGN
Rest, elevate and ice leg.  No significant benefit to compression dressing though may help with swelling. Monitor for fever, increasing pain.    Yes